# Patient Record
Sex: MALE | Race: WHITE | URBAN - METROPOLITAN AREA
[De-identification: names, ages, dates, MRNs, and addresses within clinical notes are randomized per-mention and may not be internally consistent; named-entity substitution may affect disease eponyms.]

---

## 2023-01-24 ENCOUNTER — OFFICE (OUTPATIENT)
Dept: URBAN - METROPOLITAN AREA CLINIC 114 | Facility: CLINIC | Age: 48
Setting detail: OPHTHALMOLOGY
End: 2023-01-24
Payer: COMMERCIAL

## 2023-01-24 DIAGNOSIS — H16.223: ICD-10-CM

## 2023-01-24 DIAGNOSIS — B30.9: ICD-10-CM

## 2023-01-24 DIAGNOSIS — H47.233: ICD-10-CM

## 2023-01-24 PROBLEM — H16.222 DRY EYE SYNDROME K SICCA; RIGHT EYE, LEFT EYE, BOTH EYES: Status: ACTIVE | Noted: 2023-01-24

## 2023-01-24 PROBLEM — H16.221 DRY EYE SYNDROME K SICCA; RIGHT EYE, LEFT EYE, BOTH EYES: Status: ACTIVE | Noted: 2023-01-24

## 2023-01-24 PROCEDURE — 92004 COMPRE OPH EXAM NEW PT 1/>: CPT | Performed by: OPHTHALMOLOGY

## 2023-01-24 PROCEDURE — 92250 FUNDUS PHOTOGRAPHY W/I&R: CPT | Performed by: OPHTHALMOLOGY

## 2023-01-24 PROCEDURE — 83861 MICROFLUID ANALY TEARS: CPT | Performed by: OPHTHALMOLOGY

## 2023-01-24 ASSESSMENT — KERATOMETRY
METHOD_AUTO_MANUAL: AUTO
OD_K2POWER_DIOPTERS: 41.00
OD_AXISANGLE_DEGREES: 085
OD_K1POWER_DIOPTERS: 40.75
OS_K2POWER_DIOPTERS: 40.75
OS_K1POWER_DIOPTERS: 40.50
OS_AXISANGLE_DEGREES: 106

## 2023-01-24 ASSESSMENT — TEAR BREAK UP TIME (TBUT)
OS_TBUT: T
OD_TBUT: T

## 2023-01-24 ASSESSMENT — REFRACTION_AUTOREFRACTION
OS_SPHERE: +0.25
OS_AXIS: 032
OD_SPHERE: +0.25
OS_CYLINDER: -0.25

## 2023-01-24 ASSESSMENT — TONOMETRY
OS_IOP_MMHG: 13
OD_IOP_MMHG: 14

## 2023-01-24 ASSESSMENT — CONFRONTATIONAL VISUAL FIELD TEST (CVF)
OS_FINDINGS: FULL
OD_FINDINGS: FULL

## 2023-01-24 ASSESSMENT — AXIALLENGTH_DERIVED: OS_AL: 24.6441

## 2023-01-24 ASSESSMENT — VISUAL ACUITY
OD_BCVA: 20/25
OS_BCVA: 20/25

## 2023-01-24 ASSESSMENT — SPHEQUIV_DERIVED: OS_SPHEQUIV: 0.125

## 2023-11-27 ENCOUNTER — RX ONLY (RX ONLY)
Age: 48
End: 2023-11-27

## 2023-11-27 ENCOUNTER — OFFICE (OUTPATIENT)
Dept: URBAN - METROPOLITAN AREA CLINIC 104 | Facility: CLINIC | Age: 48
Setting detail: OPHTHALMOLOGY
End: 2023-11-27
Payer: COMMERCIAL

## 2023-11-27 DIAGNOSIS — H16.221: ICD-10-CM

## 2023-11-27 DIAGNOSIS — H16.223: ICD-10-CM

## 2023-11-27 DIAGNOSIS — H47.233: ICD-10-CM

## 2023-11-27 DIAGNOSIS — H16.222: ICD-10-CM

## 2023-11-27 PROCEDURE — 99213 OFFICE O/P EST LOW 20 MIN: CPT | Performed by: OPHTHALMOLOGY

## 2023-11-27 ASSESSMENT — REFRACTION_AUTOREFRACTION
OS_AXIS: 138
OD_CYLINDER: SPH
OD_AXIS: 000
OD_SPHERE: PLANO
OS_SPHERE: PLANO
OS_CYLINDER: -0.25

## 2023-11-27 ASSESSMENT — CONFRONTATIONAL VISUAL FIELD TEST (CVF)
OS_FINDINGS: FULL
OD_FINDINGS: FULL

## 2023-11-27 ASSESSMENT — TEAR BREAK UP TIME (TBUT)
OS_TBUT: T
OD_TBUT: T

## 2024-01-12 ENCOUNTER — OUTPATIENT (OUTPATIENT)
Dept: OUTPATIENT SERVICES | Facility: HOSPITAL | Age: 49
LOS: 1 days | End: 2024-01-12
Payer: COMMERCIAL

## 2024-01-12 VITALS
DIASTOLIC BLOOD PRESSURE: 89 MMHG | TEMPERATURE: 98 F | SYSTOLIC BLOOD PRESSURE: 149 MMHG | HEIGHT: 70 IN | WEIGHT: 250 LBS | RESPIRATION RATE: 16 BRPM | HEART RATE: 60 BPM | OXYGEN SATURATION: 99 %

## 2024-01-12 DIAGNOSIS — S83.232D COMPLEX TEAR OF MEDIAL MENISCUS, CURRENT INJURY, LEFT KNEE, SUBSEQUENT ENCOUNTER: ICD-10-CM

## 2024-01-12 DIAGNOSIS — Z01.818 ENCOUNTER FOR OTHER PREPROCEDURAL EXAMINATION: ICD-10-CM

## 2024-01-12 DIAGNOSIS — Z98.890 OTHER SPECIFIED POSTPROCEDURAL STATES: Chronic | ICD-10-CM

## 2024-01-12 LAB
ANION GAP SERPL CALC-SCNC: 9 MMOL/L — SIGNIFICANT CHANGE UP (ref 5–17)
ANION GAP SERPL CALC-SCNC: 9 MMOL/L — SIGNIFICANT CHANGE UP (ref 5–17)
BUN SERPL-MCNC: 18 MG/DL — SIGNIFICANT CHANGE UP (ref 7–23)
BUN SERPL-MCNC: 18 MG/DL — SIGNIFICANT CHANGE UP (ref 7–23)
CALCIUM SERPL-MCNC: 9.2 MG/DL — SIGNIFICANT CHANGE UP (ref 8.4–10.5)
CALCIUM SERPL-MCNC: 9.2 MG/DL — SIGNIFICANT CHANGE UP (ref 8.4–10.5)
CHLORIDE SERPL-SCNC: 102 MMOL/L — SIGNIFICANT CHANGE UP (ref 96–108)
CHLORIDE SERPL-SCNC: 102 MMOL/L — SIGNIFICANT CHANGE UP (ref 96–108)
CO2 SERPL-SCNC: 27 MMOL/L — SIGNIFICANT CHANGE UP (ref 22–31)
CO2 SERPL-SCNC: 27 MMOL/L — SIGNIFICANT CHANGE UP (ref 22–31)
CREAT SERPL-MCNC: 1.09 MG/DL — SIGNIFICANT CHANGE UP (ref 0.5–1.3)
CREAT SERPL-MCNC: 1.09 MG/DL — SIGNIFICANT CHANGE UP (ref 0.5–1.3)
EGFR: 84 ML/MIN/1.73M2 — SIGNIFICANT CHANGE UP
EGFR: 84 ML/MIN/1.73M2 — SIGNIFICANT CHANGE UP
GLUCOSE SERPL-MCNC: 104 MG/DL — HIGH (ref 70–99)
GLUCOSE SERPL-MCNC: 104 MG/DL — HIGH (ref 70–99)
HCT VFR BLD CALC: 41.9 % — SIGNIFICANT CHANGE UP (ref 39–50)
HCT VFR BLD CALC: 41.9 % — SIGNIFICANT CHANGE UP (ref 39–50)
HGB BLD-MCNC: 14 G/DL — SIGNIFICANT CHANGE UP (ref 13–17)
HGB BLD-MCNC: 14 G/DL — SIGNIFICANT CHANGE UP (ref 13–17)
MCHC RBC-ENTMCNC: 29 PG — SIGNIFICANT CHANGE UP (ref 27–34)
MCHC RBC-ENTMCNC: 29 PG — SIGNIFICANT CHANGE UP (ref 27–34)
MCHC RBC-ENTMCNC: 33.4 GM/DL — SIGNIFICANT CHANGE UP (ref 32–36)
MCHC RBC-ENTMCNC: 33.4 GM/DL — SIGNIFICANT CHANGE UP (ref 32–36)
MCV RBC AUTO: 86.9 FL — SIGNIFICANT CHANGE UP (ref 80–100)
MCV RBC AUTO: 86.9 FL — SIGNIFICANT CHANGE UP (ref 80–100)
NRBC # BLD: 0 /100 WBCS — SIGNIFICANT CHANGE UP (ref 0–0)
NRBC # BLD: 0 /100 WBCS — SIGNIFICANT CHANGE UP (ref 0–0)
PLATELET # BLD AUTO: 248 K/UL — SIGNIFICANT CHANGE UP (ref 150–400)
PLATELET # BLD AUTO: 248 K/UL — SIGNIFICANT CHANGE UP (ref 150–400)
POTASSIUM SERPL-MCNC: 4.3 MMOL/L — SIGNIFICANT CHANGE UP (ref 3.5–5.3)
POTASSIUM SERPL-MCNC: 4.3 MMOL/L — SIGNIFICANT CHANGE UP (ref 3.5–5.3)
POTASSIUM SERPL-SCNC: 4.3 MMOL/L — SIGNIFICANT CHANGE UP (ref 3.5–5.3)
POTASSIUM SERPL-SCNC: 4.3 MMOL/L — SIGNIFICANT CHANGE UP (ref 3.5–5.3)
RBC # BLD: 4.82 M/UL — SIGNIFICANT CHANGE UP (ref 4.2–5.8)
RBC # BLD: 4.82 M/UL — SIGNIFICANT CHANGE UP (ref 4.2–5.8)
RBC # FLD: 12.8 % — SIGNIFICANT CHANGE UP (ref 10.3–14.5)
RBC # FLD: 12.8 % — SIGNIFICANT CHANGE UP (ref 10.3–14.5)
SODIUM SERPL-SCNC: 138 MMOL/L — SIGNIFICANT CHANGE UP (ref 135–145)
SODIUM SERPL-SCNC: 138 MMOL/L — SIGNIFICANT CHANGE UP (ref 135–145)
WBC # BLD: 5.3 K/UL — SIGNIFICANT CHANGE UP (ref 3.8–10.5)
WBC # BLD: 5.3 K/UL — SIGNIFICANT CHANGE UP (ref 3.8–10.5)
WBC # FLD AUTO: 5.3 K/UL — SIGNIFICANT CHANGE UP (ref 3.8–10.5)
WBC # FLD AUTO: 5.3 K/UL — SIGNIFICANT CHANGE UP (ref 3.8–10.5)

## 2024-01-12 PROCEDURE — 93010 ELECTROCARDIOGRAM REPORT: CPT

## 2024-01-12 PROCEDURE — G0463: CPT

## 2024-01-12 PROCEDURE — 36415 COLL VENOUS BLD VENIPUNCTURE: CPT

## 2024-01-12 PROCEDURE — 93005 ELECTROCARDIOGRAM TRACING: CPT

## 2024-01-12 PROCEDURE — 80048 BASIC METABOLIC PNL TOTAL CA: CPT

## 2024-01-12 PROCEDURE — 85027 COMPLETE CBC AUTOMATED: CPT

## 2024-01-12 NOTE — H&P PST ADULT - HISTORY OF PRESENT ILLNESS
47 y/o male with left knee pain/discomfort since aug,23. 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 Denies any acute injury, 49 y/o male with left knee pain/discomfort since aug,23. 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 Denies any acute injury, 47 y/o male with left knee pain/discomfort since aug,23. 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 Denies any acute injury, MRI revealed Medial meniscus tear and was advised surgery

## 2024-01-12 NOTE — H&P PST ADULT - NSICDXPASTMEDICALHX_GEN_ALL_CORE_FT
PAST MEDICAL HISTORY:  2019 novel coronavirus disease (COVID-19)     HLD (hyperlipidemia)     Hypertension     Hypothyroidism

## 2024-01-12 NOTE — H&P PST ADULT - ATTENDING COMMENTS
The patient has a left knee medial meniscus tear. He has failed conservative management. I recommend a left knee arthroscopy with partial medial meniscectomy v. repair. The risks of the procedure, including bleeding, infection, pain, stiffness, damage to muscles / vessels / nerves, and need for additional surgery, were reviewed. The benefits, including decreased pain and improved function, were also reviewed. The patient understands the risks and benefits and wishes to proceed with surgery.

## 2024-01-12 NOTE — H&P PST ADULT - ASSESSMENT
49 y/o male with left knee pain  Planned surgery.- left knee arthroscopy-partial medial meniscectomy  Will obtain medical clearance  Pre op instructions provided  Instructions provided on medications to continue and to take the day morning of surgery

## 2024-01-25 ENCOUNTER — OUTPATIENT (OUTPATIENT)
Dept: OUTPATIENT SERVICES | Facility: HOSPITAL | Age: 49
LOS: 1 days | End: 2024-01-25
Payer: COMMERCIAL

## 2024-01-25 VITALS
HEART RATE: 64 BPM | OXYGEN SATURATION: 98 % | DIASTOLIC BLOOD PRESSURE: 59 MMHG | RESPIRATION RATE: 19 BRPM | SYSTOLIC BLOOD PRESSURE: 97 MMHG

## 2024-01-25 VITALS
HEIGHT: 70 IN | TEMPERATURE: 98 F | HEART RATE: 78 BPM | WEIGHT: 253.97 LBS | RESPIRATION RATE: 15 BRPM | SYSTOLIC BLOOD PRESSURE: 133 MMHG | OXYGEN SATURATION: 97 % | DIASTOLIC BLOOD PRESSURE: 87 MMHG

## 2024-01-25 DIAGNOSIS — Z98.890 OTHER SPECIFIED POSTPROCEDURAL STATES: Chronic | ICD-10-CM

## 2024-01-25 DIAGNOSIS — S83.232D COMPLEX TEAR OF MEDIAL MENISCUS, CURRENT INJURY, LEFT KNEE, SUBSEQUENT ENCOUNTER: ICD-10-CM

## 2024-01-25 PROCEDURE — 97161 PT EVAL LOW COMPLEX 20 MIN: CPT

## 2024-01-25 PROCEDURE — 29881 ARTHRS KNE SRG MNISECTMY M/L: CPT | Mod: AS,LT

## 2024-01-25 PROCEDURE — 29881 ARTHRS KNE SRG MNISECTMY M/L: CPT | Mod: LT

## 2024-01-25 RX ORDER — CHLORHEXIDINE GLUCONATE 213 G/1000ML
1 SOLUTION TOPICAL ONCE
Refills: 0 | Status: COMPLETED | OUTPATIENT
Start: 2024-01-25 | End: 2024-01-25

## 2024-01-25 RX ORDER — ROSUVASTATIN CALCIUM 5 MG/1
0 TABLET ORAL
Refills: 0 | DISCHARGE

## 2024-01-25 RX ORDER — CEFAZOLIN SODIUM 1 G
2000 VIAL (EA) INJECTION ONCE
Refills: 0 | Status: COMPLETED | OUTPATIENT
Start: 2024-01-25 | End: 2024-01-25

## 2024-01-25 RX ORDER — LEVOTHYROXINE SODIUM 125 MCG
0 TABLET ORAL
Refills: 0 | DISCHARGE

## 2024-01-25 RX ORDER — SODIUM CHLORIDE 9 MG/ML
1000 INJECTION, SOLUTION INTRAVENOUS
Refills: 0 | Status: DISCONTINUED | OUTPATIENT
Start: 2024-01-25 | End: 2024-01-25

## 2024-01-25 RX ORDER — LOSARTAN POTASSIUM 100 MG/1
0 TABLET, FILM COATED ORAL
Refills: 0 | DISCHARGE

## 2024-01-25 RX ORDER — OXYCODONE HYDROCHLORIDE 5 MG/1
5 TABLET ORAL ONCE
Refills: 0 | Status: DISCONTINUED | OUTPATIENT
Start: 2024-01-25 | End: 2024-01-25

## 2024-01-25 RX ORDER — ONDANSETRON 8 MG/1
4 TABLET, FILM COATED ORAL ONCE
Refills: 0 | Status: DISCONTINUED | OUTPATIENT
Start: 2024-01-25 | End: 2024-01-25

## 2024-01-25 RX ORDER — APREPITANT 80 MG/1
40 CAPSULE ORAL ONCE
Refills: 0 | Status: COMPLETED | OUTPATIENT
Start: 2024-01-25 | End: 2024-01-25

## 2024-01-25 RX ORDER — HYDROMORPHONE HYDROCHLORIDE 2 MG/ML
1 INJECTION INTRAMUSCULAR; INTRAVENOUS; SUBCUTANEOUS
Refills: 0 | Status: DISCONTINUED | OUTPATIENT
Start: 2024-01-25 | End: 2024-01-25

## 2024-01-25 RX ORDER — HYDROMORPHONE HYDROCHLORIDE 2 MG/ML
0.5 INJECTION INTRAMUSCULAR; INTRAVENOUS; SUBCUTANEOUS
Refills: 0 | Status: DISCONTINUED | OUTPATIENT
Start: 2024-01-25 | End: 2024-01-25

## 2024-01-25 RX ORDER — NEOMYCIN/POLYMYXIN B/DEXAMETHA 0.1 %
0 SUSPENSION, DROPS(FINAL DOSAGE FORM)(ML) OPHTHALMIC (EYE)
Refills: 0 | DISCHARGE

## 2024-01-25 RX ADMIN — APREPITANT 40 MILLIGRAM(S): 80 CAPSULE ORAL at 06:47

## 2024-01-25 RX ADMIN — CHLORHEXIDINE GLUCONATE 1 APPLICATION(S): 213 SOLUTION TOPICAL at 06:48

## 2024-01-25 NOTE — ASU DISCHARGE PLAN (ADULT/PEDIATRIC) - CARE PROVIDER_API CALL
Ranjan Dobson Yonathan  Orthopaedic Surgery  [address] [place] [address]  Phone: [phone]  Fax: [phone]  Follow Up Time:

## 2024-01-25 NOTE — BRIEF OPERATIVE NOTE - NSICDXBRIEFPROCEDURE_GEN_ALL_CORE_FT
PROCEDURES:  Arthroscopy, knee, with partial medial meniscectomy 25-Jan-2024 08:47:15  Eddie Hebert

## 2024-01-25 NOTE — PHYSICAL THERAPY INITIAL EVALUATION ADULT - PERTINENT HX OF CURRENT PROBLEM, REHAB EVAL
47 y/o male with left knee pain/discomfort since aug,23. 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 Denies any acute injury, MRI revealed Medial meniscus tear and was advised surgery

## 2024-01-25 NOTE — BRIEF OPERATIVE NOTE - VENOUS THROMBOEMBOLISM PROPHYLAXIS THERAPY
----- Message from Ezra Handy MD sent at 1/15/2021  4:20 PM CST -----  Normal echo- (no surprises).  Good news, please call.  D  
Called and reviewed results per Dr. Handy request. Patient very appreciative of the phone and quick results today.   
PAS

## 2024-01-25 NOTE — PHYSICAL THERAPY INITIAL EVALUATION ADULT - PLANNED THERAPY INTERVENTIONS, PT EVAL
Pt received in PACU,sitting OOB chair. Pt seen for transfer, ambulation training WBAT left LE with cane. Pt verbally educated on stair negotiation and given written instructions. Pt given cane for home use. Pt is cleared from PT.

## 2024-01-25 NOTE — ASU DISCHARGE PLAN (ADULT/PEDIATRIC) - NS MD DC FALL RISK RISK
For information on Fall & Injury Prevention, visit: [email or web address] OR  [email or web address] OR  https://www.cdc.gov/steadi/patient.html

## 2024-01-25 NOTE — ASU PATIENT PROFILE, ADULT - PREOP PAIN SCORE
----- Message from Riya Jones DO sent at 7/27/2017  8:31 AM EDT -----  This is a little on the high side but not markedly elevated and this lady has some dementia and I do not think wanted to be on statins and actually came off of Lipitor, so I would just tell her that looks fairly good and I do not recommend any other treatment at this time.  ES 0

## 2024-01-25 NOTE — ASU PREOP CHECKLIST - HAIR REMOVAL
Continue your home medications as prescribed  Drink plenty of fluids for hydration  Return to the emergency department for any worsening symptoms or new concerns
clipper

## 2024-02-21 NOTE — ASU PATIENT PROFILE, ADULT - HISTORY OF COVID-19 VACCINATION
Detail Level: Detailed Was A Bandage Applied: Yes Punch Size In Mm: 5 Size Of Lesion In Cm (Optional): 0 Depth Of Punch Biopsy: dermis Biopsy Type: H and E Anesthesia Type: 1% lidocaine with epinephrine Anesthesia Volume In Cc: 0.5 Hemostasis: None Epidermal Sutures: 5-0 Prolene Wound Care: Vaseline Dressing: bandage Suture Removal: 14 days Patient Will Remove Sutures At Home?: No Lab: -[id number] Consent: Written consent was obtained and risks were reviewed including but not limited to scarring, infection, bleeding, scabbing, incomplete removal, nerve damage and allergy to anesthesia. Post-Care Instructions: I reviewed with the patient in detail post-care instructions. Patient is to keep the biopsy site dry overnight, and then apply bacitracin twice daily until healed. Patient may apply hydrogen peroxide soaks to remove any crusting. Home Suture Removal Text: Patient was provided a home suture removal kit and will remove their sutures at home.  If they have any questions or difficulties they will call the office. Notification Instructions: Patient will be notified of biopsy results. However, patient instructed to call the office if not contacted within 2 weeks. Billing Type: Third-Party Bill Information: Selecting Yes will display possible errors in your note based on the variables you have selected. This validation is only offered as a suggestion for you. PLEASE NOTE THAT THE VALIDATION TEXT WILL BE REMOVED WHEN YOU FINALIZE YOUR NOTE. IF YOU WANT TO FAX A PRELIMINARY NOTE YOU WILL NEED TO TOGGLE THIS TO 'NO' IF YOU DO NOT WANT IT IN YOUR FAXED NOTE. Yes

## 2024-03-04 ENCOUNTER — RX ONLY (RX ONLY)
Age: 49
End: 2024-03-04

## 2024-03-04 ENCOUNTER — OFFICE (OUTPATIENT)
Dept: URBAN - METROPOLITAN AREA CLINIC 104 | Facility: CLINIC | Age: 49
Setting detail: OPHTHALMOLOGY
End: 2024-03-04
Payer: COMMERCIAL

## 2024-03-04 DIAGNOSIS — H16.223: ICD-10-CM

## 2024-03-04 DIAGNOSIS — H47.233: ICD-10-CM

## 2024-03-04 PROBLEM — H16.222 DRY EYE SYNDROME K SICCA; RIGHT EYE, LEFT EYE, BOTH EYES: Status: ACTIVE | Noted: 2024-03-04

## 2024-03-04 PROBLEM — H16.221 DRY EYE SYNDROME K SICCA; RIGHT EYE, LEFT EYE, BOTH EYES: Status: ACTIVE | Noted: 2024-03-04

## 2024-03-04 PROCEDURE — 99213 OFFICE O/P EST LOW 20 MIN: CPT | Performed by: OPHTHALMOLOGY

## 2024-03-04 PROCEDURE — 83861 MICROFLUID ANALY TEARS: CPT | Performed by: OPHTHALMOLOGY

## 2024-06-10 ENCOUNTER — OFFICE (OUTPATIENT)
Dept: URBAN - METROPOLITAN AREA CLINIC 104 | Facility: CLINIC | Age: 49
Setting detail: OPHTHALMOLOGY
End: 2024-06-10
Payer: COMMERCIAL

## 2024-06-10 ENCOUNTER — RX ONLY (RX ONLY)
Age: 49
End: 2024-06-10

## 2024-06-10 DIAGNOSIS — H01.004: ICD-10-CM

## 2024-06-10 DIAGNOSIS — H01.001: ICD-10-CM

## 2024-06-10 DIAGNOSIS — H16.223: ICD-10-CM

## 2024-06-10 DIAGNOSIS — H01.002: ICD-10-CM

## 2024-06-10 DIAGNOSIS — H01.005: ICD-10-CM

## 2024-06-10 PROBLEM — B88.0 GLAUCOMATOUS ATROPHY  OF OPTIC DISC; BOTH EYES: Status: ACTIVE | Noted: 2024-06-10

## 2024-06-10 PROBLEM — B88.0 ACARIASIS, INFESTATION OF MITES AND OR TICS: Status: ACTIVE | Noted: 2024-06-10

## 2024-06-10 PROCEDURE — 99213 OFFICE O/P EST LOW 20 MIN: CPT | Performed by: OPHTHALMOLOGY

## 2024-06-10 PROCEDURE — 83861 MICROFLUID ANALY TEARS: CPT | Performed by: OPHTHALMOLOGY

## 2024-06-10 ASSESSMENT — LID EXAM ASSESSMENTS
OD_COMMENTS: NO COLLARETS
OD_BLEPHARITIS: RLL RUL T
OS_BLEPHARITIS: LLL LUL T
OS_COMMENTS: NO COLLARETS

## 2024-06-10 ASSESSMENT — CONFRONTATIONAL VISUAL FIELD TEST (CVF)
OS_FINDINGS: FULL
OD_FINDINGS: FULL

## 2024-09-16 ENCOUNTER — OFFICE (OUTPATIENT)
Dept: URBAN - METROPOLITAN AREA CLINIC 104 | Facility: CLINIC | Age: 49
Setting detail: OPHTHALMOLOGY
End: 2024-09-16
Payer: COMMERCIAL

## 2024-09-16 DIAGNOSIS — H01.001: ICD-10-CM

## 2024-09-16 DIAGNOSIS — H16.222: ICD-10-CM

## 2024-09-16 DIAGNOSIS — B88.0: ICD-10-CM

## 2024-09-16 DIAGNOSIS — H16.223: ICD-10-CM

## 2024-09-16 DIAGNOSIS — H01.005: ICD-10-CM

## 2024-09-16 DIAGNOSIS — H16.221: ICD-10-CM

## 2024-09-16 DIAGNOSIS — H01.002: ICD-10-CM

## 2024-09-16 DIAGNOSIS — H01.004: ICD-10-CM

## 2024-09-16 PROCEDURE — 83861 MICROFLUID ANALY TEARS: CPT | Performed by: OPHTHALMOLOGY

## 2024-09-16 PROCEDURE — 92133 CPTRZD OPH DX IMG PST SGM ON: CPT | Performed by: OPHTHALMOLOGY

## 2024-09-16 PROCEDURE — 99213 OFFICE O/P EST LOW 20 MIN: CPT | Performed by: OPHTHALMOLOGY

## 2024-09-16 ASSESSMENT — CONFRONTATIONAL VISUAL FIELD TEST (CVF)
OD_FINDINGS: FULL
OS_FINDINGS: FULL

## 2024-11-11 PROBLEM — E78.5 HYPERLIPIDEMIA, UNSPECIFIED: Chronic | Status: ACTIVE | Noted: 2024-01-12

## 2024-11-11 PROBLEM — E03.9 HYPOTHYROIDISM, UNSPECIFIED: Chronic | Status: ACTIVE | Noted: 2024-01-12

## 2024-11-11 PROBLEM — I10 ESSENTIAL (PRIMARY) HYPERTENSION: Chronic | Status: ACTIVE | Noted: 2024-01-12

## 2024-11-11 PROBLEM — U07.1 COVID-19: Chronic | Status: ACTIVE | Noted: 2024-01-12

## 2024-11-22 PROBLEM — Z00.00 ENCOUNTER FOR PREVENTIVE HEALTH EXAMINATION: Status: ACTIVE | Noted: 2024-11-22

## 2024-11-27 ENCOUNTER — APPOINTMENT (OUTPATIENT)
Dept: ORTHOPEDIC SURGERY | Facility: CLINIC | Age: 49
End: 2024-11-27
Payer: COMMERCIAL

## 2024-11-27 DIAGNOSIS — M25.532 PAIN IN RIGHT WRIST: ICD-10-CM

## 2024-11-27 DIAGNOSIS — G56.00 CARPAL TUNNEL SYNDROME, UNSPECIFIED UPPER LIMB: ICD-10-CM

## 2024-11-27 DIAGNOSIS — M25.531 PAIN IN RIGHT WRIST: ICD-10-CM

## 2024-11-27 PROCEDURE — 20526 THER INJECTION CARP TUNNEL: CPT | Mod: 50

## 2024-11-27 PROCEDURE — 99204 OFFICE O/P NEW MOD 45 MIN: CPT | Mod: 25

## 2024-11-27 PROCEDURE — 73110 X-RAY EXAM OF WRIST: CPT | Mod: 50

## 2025-01-04 ENCOUNTER — APPOINTMENT (OUTPATIENT)
Dept: ORTHOPEDIC SURGERY | Facility: CLINIC | Age: 50
End: 2025-01-04
Payer: COMMERCIAL

## 2025-01-04 VITALS
HEIGHT: 70 IN | DIASTOLIC BLOOD PRESSURE: 98 MMHG | HEART RATE: 73 BPM | SYSTOLIC BLOOD PRESSURE: 151 MMHG | WEIGHT: 260 LBS | BODY MASS INDEX: 37.22 KG/M2

## 2025-01-04 DIAGNOSIS — M54.50 LOW BACK PAIN, UNSPECIFIED: ICD-10-CM

## 2025-01-04 DIAGNOSIS — Z78.9 OTHER SPECIFIED HEALTH STATUS: ICD-10-CM

## 2025-01-04 DIAGNOSIS — M25.551 PAIN IN RIGHT HIP: ICD-10-CM

## 2025-01-04 DIAGNOSIS — Z86.39 PERSONAL HISTORY OF OTHER ENDOCRINE, NUTRITIONAL AND METABOLIC DISEASE: ICD-10-CM

## 2025-01-04 DIAGNOSIS — S79.911A UNSPECIFIED INJURY OF RIGHT HIP, INITIAL ENCOUNTER: ICD-10-CM

## 2025-01-04 DIAGNOSIS — Z86.79 PERSONAL HISTORY OF OTHER DISEASES OF THE CIRCULATORY SYSTEM: ICD-10-CM

## 2025-01-04 PROCEDURE — 99204 OFFICE O/P NEW MOD 45 MIN: CPT

## 2025-01-04 PROCEDURE — 73502 X-RAY EXAM HIP UNI 2-3 VIEWS: CPT

## 2025-01-04 PROCEDURE — 72100 X-RAY EXAM L-S SPINE 2/3 VWS: CPT

## 2025-01-04 RX ORDER — LEVOTHYROXINE SODIUM 137 UG/1
TABLET ORAL
Refills: 0 | Status: ACTIVE | COMMUNITY

## 2025-01-04 RX ORDER — ROSUVASTATIN CALCIUM 10 MG/1
10 TABLET, FILM COATED ORAL
Refills: 0 | Status: ACTIVE | COMMUNITY

## 2025-01-04 RX ORDER — LOSARTAN POTASSIUM 100 MG/1
TABLET, FILM COATED ORAL
Refills: 0 | Status: ACTIVE | COMMUNITY

## 2025-01-06 PROBLEM — M54.50 PAIN, LOW BACK: Status: ACTIVE | Noted: 2025-01-04

## 2025-01-06 PROBLEM — M25.551 RIGHT HIP PAIN: Status: ACTIVE | Noted: 2025-01-04

## 2025-01-07 ENCOUNTER — APPOINTMENT (OUTPATIENT)
Dept: MRI IMAGING | Facility: CLINIC | Age: 50
End: 2025-01-07
Payer: COMMERCIAL

## 2025-01-07 PROCEDURE — 73721 MRI JNT OF LWR EXTRE W/O DYE: CPT | Mod: RT

## 2025-01-08 ENCOUNTER — NON-APPOINTMENT (OUTPATIENT)
Age: 50
End: 2025-01-08

## 2025-01-10 ENCOUNTER — APPOINTMENT (OUTPATIENT)
Dept: ORTHOPEDIC SURGERY | Facility: CLINIC | Age: 50
End: 2025-01-10

## 2025-01-14 ENCOUNTER — APPOINTMENT (OUTPATIENT)
Dept: ORTHOPEDIC SURGERY | Facility: CLINIC | Age: 50
End: 2025-01-14
Payer: COMMERCIAL

## 2025-01-14 VITALS
OXYGEN SATURATION: 99 % | DIASTOLIC BLOOD PRESSURE: 90 MMHG | SYSTOLIC BLOOD PRESSURE: 136 MMHG | BODY MASS INDEX: 37.22 KG/M2 | HEART RATE: 82 BPM | WEIGHT: 260 LBS | HEIGHT: 70 IN

## 2025-01-14 DIAGNOSIS — M87.051 IDIOPATHIC ASEPTIC NECROSIS OF RIGHT FEMUR: ICD-10-CM

## 2025-01-14 PROCEDURE — 73502 X-RAY EXAM HIP UNI 2-3 VIEWS: CPT

## 2025-01-14 PROCEDURE — G2211 COMPLEX E/M VISIT ADD ON: CPT

## 2025-01-14 PROCEDURE — 99215 OFFICE O/P EST HI 40 MIN: CPT

## 2025-01-15 ENCOUNTER — APPOINTMENT (OUTPATIENT)
Dept: ORTHOPEDIC SURGERY | Facility: CLINIC | Age: 50
End: 2025-01-15

## 2025-01-28 RX ORDER — NAPROXEN 500 MG/1
500 TABLET ORAL
Qty: 30 | Refills: 0 | Status: ACTIVE | COMMUNITY
Start: 2025-01-28 | End: 1900-01-01

## 2025-02-07 RX ORDER — MELOXICAM 7.5 MG/1
7.5 TABLET ORAL
Qty: 60 | Refills: 0 | Status: ACTIVE | COMMUNITY
Start: 2025-02-07 | End: 1900-01-01

## 2025-02-07 RX ORDER — TRAMADOL HYDROCHLORIDE 50 MG/1
50 TABLET, COATED ORAL
Qty: 21 | Refills: 0 | Status: ACTIVE | COMMUNITY
Start: 2025-02-07 | End: 1900-01-01

## 2025-02-13 ENCOUNTER — APPOINTMENT (OUTPATIENT)
Dept: INTERVENTIONAL RADIOLOGY/VASCULAR | Facility: CLINIC | Age: 50
End: 2025-02-13

## 2025-02-21 ENCOUNTER — NON-APPOINTMENT (OUTPATIENT)
Age: 50
End: 2025-02-21

## 2025-03-10 ENCOUNTER — OFFICE (OUTPATIENT)
Dept: URBAN - METROPOLITAN AREA CLINIC 115 | Facility: CLINIC | Age: 50
Setting detail: OPHTHALMOLOGY
End: 2025-03-10
Payer: COMMERCIAL

## 2025-03-10 DIAGNOSIS — H01.001: ICD-10-CM

## 2025-03-10 DIAGNOSIS — H01.005: ICD-10-CM

## 2025-03-10 DIAGNOSIS — H01.002: ICD-10-CM

## 2025-03-10 DIAGNOSIS — H01.004: ICD-10-CM

## 2025-03-10 PROBLEM — H16.223 DRY EYE SYNDROME K SICCA; RIGHT EYE, LEFT EYE, BOTH EYES: Status: ACTIVE | Noted: 2025-03-10

## 2025-03-10 PROBLEM — H16.222 DRY EYE SYNDROME K SICCA; RIGHT EYE, LEFT EYE, BOTH EYES: Status: ACTIVE | Noted: 2025-03-10

## 2025-03-10 PROBLEM — H16.221 DRY EYE SYNDROME K SICCA; RIGHT EYE, LEFT EYE, BOTH EYES: Status: ACTIVE | Noted: 2025-03-10

## 2025-03-10 PROCEDURE — 92012 INTRM OPH EXAM EST PATIENT: CPT | Performed by: OPHTHALMOLOGY

## 2025-03-10 ASSESSMENT — LID EXAM ASSESSMENTS
OD_BLEPHARITIS: RLL RUL T 1+
OS_BLEPHARITIS: LLL LUL T 1+

## 2025-03-10 ASSESSMENT — REFRACTION_AUTOREFRACTION
OS_SPHERE: UTP
OD_SPHERE: UTP

## 2025-03-10 ASSESSMENT — VISUAL ACUITY
OS_BCVA: 20/20
OD_BCVA: 20/25

## 2025-03-10 ASSESSMENT — CONFRONTATIONAL VISUAL FIELD TEST (CVF)
OD_FINDINGS: FULL
OS_FINDINGS: FULL

## 2025-03-10 ASSESSMENT — KERATOMETRY
OS_K1POWER_DIOPTERS: 40.37
OS_K2POWER_DIOPTERS: 40.61
OD_K1POWER_DIOPTERS: 40.66
OD_K2POWER_DIOPTERS: 40.81
OS_AXISANGLE_DEGREES: 160
OD_AXISANGLE_DEGREES: 079

## 2025-03-10 ASSESSMENT — TEAR BREAK UP TIME (TBUT)
OD_TBUT: T
OS_TBUT: T

## 2025-04-21 ENCOUNTER — APPOINTMENT (OUTPATIENT)
Dept: CT IMAGING | Facility: CLINIC | Age: 50
End: 2025-04-21
Payer: COMMERCIAL

## 2025-04-21 PROCEDURE — 73700 CT LOWER EXTREMITY W/O DYE: CPT | Mod: RT

## 2025-05-01 ENCOUNTER — OUTPATIENT (OUTPATIENT)
Dept: OUTPATIENT SERVICES | Facility: HOSPITAL | Age: 50
LOS: 1 days | End: 2025-05-01
Payer: COMMERCIAL

## 2025-05-01 VITALS
HEIGHT: 70 IN | WEIGHT: 249.12 LBS | OXYGEN SATURATION: 99 % | RESPIRATION RATE: 18 BRPM | DIASTOLIC BLOOD PRESSURE: 80 MMHG | SYSTOLIC BLOOD PRESSURE: 130 MMHG | TEMPERATURE: 97 F | HEART RATE: 81 BPM

## 2025-05-01 DIAGNOSIS — Z98.890 OTHER SPECIFIED POSTPROCEDURAL STATES: Chronic | ICD-10-CM

## 2025-05-01 DIAGNOSIS — E03.9 HYPOTHYROIDISM, UNSPECIFIED: ICD-10-CM

## 2025-05-01 DIAGNOSIS — M87.051 IDIOPATHIC ASEPTIC NECROSIS OF RIGHT FEMUR: ICD-10-CM

## 2025-05-01 DIAGNOSIS — Z01.818 ENCOUNTER FOR OTHER PREPROCEDURAL EXAMINATION: ICD-10-CM

## 2025-05-01 DIAGNOSIS — Z29.9 ENCOUNTER FOR PROPHYLACTIC MEASURES, UNSPECIFIED: ICD-10-CM

## 2025-05-01 DIAGNOSIS — I10 ESSENTIAL (PRIMARY) HYPERTENSION: ICD-10-CM

## 2025-05-01 LAB
A1C WITH ESTIMATED AVERAGE GLUCOSE RESULT: 5.6 % — SIGNIFICANT CHANGE UP (ref 4–5.6)
ALBUMIN SERPL ELPH-MCNC: 4.4 G/DL — SIGNIFICANT CHANGE UP (ref 3.3–5.2)
ALP SERPL-CCNC: 50 U/L — SIGNIFICANT CHANGE UP (ref 40–120)
ALT FLD-CCNC: 46 U/L — HIGH
ANION GAP SERPL CALC-SCNC: 16 MMOL/L — SIGNIFICANT CHANGE UP (ref 5–17)
APTT BLD: 31.7 SEC — SIGNIFICANT CHANGE UP (ref 26.1–36.8)
AST SERPL-CCNC: 48 U/L — HIGH
BASOPHILS # BLD AUTO: 0.09 K/UL — SIGNIFICANT CHANGE UP (ref 0–0.2)
BASOPHILS NFR BLD AUTO: 1.4 % — SIGNIFICANT CHANGE UP (ref 0–2)
BILIRUB SERPL-MCNC: 0.3 MG/DL — LOW (ref 0.4–2)
BLD GP AB SCN SERPL QL: SIGNIFICANT CHANGE UP
BUN SERPL-MCNC: 19 MG/DL — SIGNIFICANT CHANGE UP (ref 8–20)
CALCIUM SERPL-MCNC: 8.9 MG/DL — SIGNIFICANT CHANGE UP (ref 8.4–10.5)
CHLORIDE SERPL-SCNC: 102 MMOL/L — SIGNIFICANT CHANGE UP (ref 96–108)
CO2 SERPL-SCNC: 22 MMOL/L — SIGNIFICANT CHANGE UP (ref 22–29)
CREAT SERPL-MCNC: 0.89 MG/DL — SIGNIFICANT CHANGE UP (ref 0.5–1.3)
EGFR: 105 ML/MIN/1.73M2 — SIGNIFICANT CHANGE UP
EGFR: 105 ML/MIN/1.73M2 — SIGNIFICANT CHANGE UP
EOSINOPHIL # BLD AUTO: 0.21 K/UL — SIGNIFICANT CHANGE UP (ref 0–0.5)
EOSINOPHIL NFR BLD AUTO: 3.3 % — SIGNIFICANT CHANGE UP (ref 0–6)
ESTIMATED AVERAGE GLUCOSE: 114 MG/DL — SIGNIFICANT CHANGE UP (ref 68–114)
GLUCOSE SERPL-MCNC: 104 MG/DL — HIGH (ref 70–99)
HCT VFR BLD CALC: 39.9 % — SIGNIFICANT CHANGE UP (ref 39–50)
HGB BLD-MCNC: 13.2 G/DL — SIGNIFICANT CHANGE UP (ref 13–17)
IMM GRANULOCYTES # BLD AUTO: 0.01 K/UL — SIGNIFICANT CHANGE UP (ref 0–0.07)
IMM GRANULOCYTES NFR BLD AUTO: 0.2 % — SIGNIFICANT CHANGE UP (ref 0–0.9)
INR BLD: 0.95 RATIO — SIGNIFICANT CHANGE UP (ref 0.85–1.16)
LYMPHOCYTES # BLD AUTO: 2.11 K/UL — SIGNIFICANT CHANGE UP (ref 1–3.3)
LYMPHOCYTES NFR BLD AUTO: 33.5 % — SIGNIFICANT CHANGE UP (ref 13–44)
MCHC RBC-ENTMCNC: 28.4 PG — SIGNIFICANT CHANGE UP (ref 27–34)
MCHC RBC-ENTMCNC: 33.1 G/DL — SIGNIFICANT CHANGE UP (ref 32–36)
MCV RBC AUTO: 85.8 FL — SIGNIFICANT CHANGE UP (ref 80–100)
MONOCYTES # BLD AUTO: 0.68 K/UL — SIGNIFICANT CHANGE UP (ref 0–0.9)
MONOCYTES NFR BLD AUTO: 10.8 % — SIGNIFICANT CHANGE UP (ref 2–14)
MRSA PCR RESULT.: SIGNIFICANT CHANGE UP
NEUTROPHILS # BLD AUTO: 3.19 K/UL — SIGNIFICANT CHANGE UP (ref 1.8–7.4)
NEUTROPHILS NFR BLD AUTO: 50.8 % — SIGNIFICANT CHANGE UP (ref 43–77)
NRBC # BLD AUTO: 0 K/UL — SIGNIFICANT CHANGE UP (ref 0–0)
NRBC # FLD: 0 K/UL — SIGNIFICANT CHANGE UP (ref 0–0)
NRBC BLD AUTO-RTO: 0 /100 WBCS — SIGNIFICANT CHANGE UP (ref 0–0)
PLATELET # BLD AUTO: 279 K/UL — SIGNIFICANT CHANGE UP (ref 150–400)
PMV BLD: 10.4 FL — SIGNIFICANT CHANGE UP (ref 7–13)
POTASSIUM SERPL-MCNC: 4 MMOL/L — SIGNIFICANT CHANGE UP (ref 3.5–5.3)
POTASSIUM SERPL-SCNC: 4 MMOL/L — SIGNIFICANT CHANGE UP (ref 3.5–5.3)
PROT SERPL-MCNC: 7.2 G/DL — SIGNIFICANT CHANGE UP (ref 6.6–8.7)
PROTHROM AB SERPL-ACNC: 11 SEC — SIGNIFICANT CHANGE UP (ref 9.9–13.4)
RBC # BLD: 4.65 M/UL — SIGNIFICANT CHANGE UP (ref 4.2–5.8)
RBC # FLD: 12.6 % — SIGNIFICANT CHANGE UP (ref 10.3–14.5)
S AUREUS DNA NOSE QL NAA+PROBE: DETECTED
SODIUM SERPL-SCNC: 140 MMOL/L — SIGNIFICANT CHANGE UP (ref 135–145)
T3 SERPL-MCNC: 113 NG/DL — SIGNIFICANT CHANGE UP (ref 80–200)
T4 AB SER-ACNC: 9.4 UG/DL — SIGNIFICANT CHANGE UP (ref 4.5–12)
TSH SERPL-MCNC: 1.32 UIU/ML — SIGNIFICANT CHANGE UP (ref 0.27–4.2)
WBC # BLD: 6.29 K/UL — SIGNIFICANT CHANGE UP (ref 3.8–10.5)
WBC # FLD AUTO: 6.29 K/UL — SIGNIFICANT CHANGE UP (ref 3.8–10.5)

## 2025-05-01 PROCEDURE — 86850 RBC ANTIBODY SCREEN: CPT

## 2025-05-01 PROCEDURE — 93010 ELECTROCARDIOGRAM REPORT: CPT

## 2025-05-01 PROCEDURE — 93005 ELECTROCARDIOGRAM TRACING: CPT

## 2025-05-01 PROCEDURE — 86901 BLOOD TYPING SEROLOGIC RH(D): CPT

## 2025-05-01 PROCEDURE — 85610 PROTHROMBIN TIME: CPT

## 2025-05-01 PROCEDURE — 84436 ASSAY OF TOTAL THYROXINE: CPT

## 2025-05-01 PROCEDURE — G0463: CPT

## 2025-05-01 PROCEDURE — 87640 STAPH A DNA AMP PROBE: CPT

## 2025-05-01 PROCEDURE — 84443 ASSAY THYROID STIM HORMONE: CPT

## 2025-05-01 PROCEDURE — 80053 COMPREHEN METABOLIC PANEL: CPT

## 2025-05-01 PROCEDURE — 86900 BLOOD TYPING SEROLOGIC ABO: CPT

## 2025-05-01 PROCEDURE — 36415 COLL VENOUS BLD VENIPUNCTURE: CPT

## 2025-05-01 PROCEDURE — 85025 COMPLETE CBC W/AUTO DIFF WBC: CPT

## 2025-05-01 PROCEDURE — 84480 ASSAY TRIIODOTHYRONINE (T3): CPT

## 2025-05-01 PROCEDURE — 83036 HEMOGLOBIN GLYCOSYLATED A1C: CPT

## 2025-05-01 PROCEDURE — 85730 THROMBOPLASTIN TIME PARTIAL: CPT

## 2025-05-01 PROCEDURE — 87641 MR-STAPH DNA AMP PROBE: CPT

## 2025-05-01 RX ORDER — LOSARTAN POTASSIUM 100 MG/1
1 TABLET, FILM COATED ORAL
Refills: 0 | DISCHARGE

## 2025-05-01 NOTE — H&P PST ADULT - HEIGHT IN FEET
----- Message from [facility], LPN sent at 2/84/8845 11:36 AM EDT -----  Regarding: Derekjanee Darby  04/19/22 11:36 AM    Hello, our patient Nicole Duggan has had Mammogram completed/performed  Please assist in updating the patient chart by making an External outreach to [facility]   located in [address] [place] [address]  The date of service is 12/2021      Thank you,  louie becerra, LPN  [address]
Upon review of the In Basket request we have found that we are unable to obtain a copy of the exclusion documentation requested because the patient is male and there is no indication in the patient chart that a mammogram has ever been indicated  Any additional questions or concerns should be emailed to the Practice Liaisons via [email or web address] email, please do not reply via In Basket      Thank you  Carlie Gann
5

## 2025-05-01 NOTE — H&P PST ADULT - ASSESSMENT
Pt  is a 49 year old male with history of  hypothyroidism , HLD , HTN and c/o  of right hip pain for about 1 year .  His hip pain started  localized to the groin and  is worse with deep ROM, as well as with weightbearing activity. 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 5/10 pain with sitting today. Pt takes naproxen with mild relief. Ambulates without assistance. 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 Patient admits to drinking daily beer for several years but  has stopped > 1 month ago since diagnosis. Pt is scheduled for Right hip Lion total hip replacement anterior approach with  Dr. Olmedo on  25 . HOLD NSAIDS ( naproxen ) / AS/Vitamins 1 week preop.  SYnthroid in am as prescribed, hold losartan in am of surgery.. medical clearance. Patient was given information on joint class, joint book provided, ERP protocol reviewed with patient, MSSA/MRSA swabbed in PST, results pending  OPIOID RISK TOOL    MASON EACH BOX THAT APPLIES AND ADD TOTALS AT THE END    FAMILY HISTORY OF SUBSTANCE ABUSE                 FEMALE         MALE                                                Alcohol                             [  ]1 pt          [  ]3pts                                               Illegal Durgs                     [  ]2 pts        [  ]3pts                                               Rx Drugs                           [  ]4 pts        [  ]4 pts    PERSONAL HISTORY OF SUBSTANCE ABUSE                                                                                          Alcohol                             [  ]3 pts       [x  ]3 pts                                               Illegal Durgs                     [  ]4 pts        [  ]4 pts                                               Rx Drugs                           [  ]5 pts        [  ]5 pts    AGE BETWEEN 16-45 YEARS                                      [  ]1 pt         [  ]1 pt    HISTORY OF PREADOLESCENT   SEXUAL ABUSE                                                             [  ]3 pts        [  ]0pts    PSYCHOLOGICAL DISEASE                     ADD, OCD, Bipolar, Schizophrenia        [  ]2 pts         [  ]2 pts                      Depression                                               [  ]1 pt           [  ]1 pt           SCORING TOTAL   (add numbers and type here)              (***3)                                     A score of 3 or lower indicated LOW risk for future opiod abuse  A score of 4 to 7 indicated moderate risk for future opiod abuse  A score of 8 or higher indicates a high risk for opiod abuse  CAPRINI SCORE    AGE RELATED RISK FACTORS                                                             [X ] Age 41-60 years                                            (1 Point)  [ ] Age: 61-74 years                                           (2 Points)                 [ ] Age= 75 years                                                (3 Points)             DISEASE RELATED RISK FACTORS                                                       [ ] Edema in the lower extremities                 (1 Point)                     [ ] Varicose veins                                               (1 Point)                                 [ X] BMI > 25 Kg/m2                                            (1 Point)                                  [ ] Serious infection (ie PNA)                            (1 Point)                     [ ] Lung disease ( COPD, Emphysema)            (1 Point)                                                                          [ ] Acute myocardial infarction                         (1 Point)                  [ ] Congestive heart failure (in the previous month)  (1 Point)         [ ] Inflammatory bowel disease                            (1 Point)                  [ ] Central venous access, PICC or Port               (2 points)       (within the last month)                                                                [ ] Stroke (in the previous month)                        (5 Points)    [ ] Previous or present malignancy                       (2 points)                                                                                                                                                         HEMATOLOGY RELATED FACTORS                                                         [ ] Prior episodes of VTE                                     (3 Points)                     [ ] Positive family history for VTE                      (3 Points)                  [ ] Prothrombin 16985 A                                     (3 Points)                     [ ] Factor V Leiden                                                (3 Points)                        [ ] Lupus anticoagulants                                      (3 Points)                                                           [ ] Anticardiolipin antibodies                              (3 Points)                                                       [ ] High homocysteine in the blood                   (3 Points)                                             [ ] Other congenital or acquired thrombophilia      (3 Points)                                                [ ] Heparin induced thrombocytopenia                  (3 Points)                                        MOBILITY RELATED FACTORS  [ ] Bed rest                                                         (1 Point)  [ ] Plaster cast                                                    (2 points)  [ ] Bed bound for more than 72 hours           (2 Points)    GENDER SPECIFIC FACTORS  [ ] Pregnancy or had a baby within the last month   (1 Point)  [ ] Post-partum < 6 weeks                                   (1 Point)  [ ] Hormonal therapy  or oral contraception   (1 Point)  [ ] History of pregnancy complications              (1 point)  [ ] Unexplained or recurrent              (1 Point)    OTHER RISK FACTORS                                           (1 Point)  [ ] BMI >40, smoking, diabetes requiring insulin, chemotherapy  blood transfusions and length of surgery over 2 hours    SURGERY RELATED RISK FACTORS  [ ]  Section within the last month     (1 Point)  [ ] Minor surgery                                                  (1 Point)  [ ] Arthroscopic surgery                                       (2 Points)  [ ] Planned major surgery lasting more            (2 Points)      than 45 minutes     [X ] Elective hip or knee joint replacement       (5 points)       surgery                                                TRAUMA RELATED RISK FACTORS  [ ] Fracture of the hip, pelvis, or leg                       (5 Points)  [ ] Spinal cord injury resulting in paralysis             (5 points)       (in the previous month)    [ ] Paralysis  (less than 1 month)                             (5 Points)  [ ] Multiple Trauma within 1 month                        (5 Points)    Total Score [   7     ]    Caprini Score 0-2: Low Risk, NO VTE prophylaxis required for most patients, encourage ambulation  Caprini Score 3-6: Moderate Risk , pharmacologic VTE prophylaxis is indicated for most patients (in the absence of contraindications)  Caprini Score Greater than or =7: High risk, pharmocologic VTE prophylaxis indicated for most patients (in the absence of contraindications)

## 2025-05-01 NOTE — H&P PST ADULT - MUSCULOSKELETAL COMMENTS
right hip and groin pain, andrzej carpal tunnel numbness right hip and groin pain, andrzej carpal tunnel numbness, andrzej hand numb at times right hip pain with sittine and leg extension

## 2025-05-01 NOTE — H&P PST ADULT - PROBLEM SELECTOR PLAN 1
Pt is scheduled for Right hip Lion total hip replacement anterior approach with  Dr. Olmedo on  5/21/25 .   HOLD NSAIDS ( naproxen ) / AS/Vitamins 1 week preop.    SYnthroid in am as prescribed, hold losartan in am of surgery..   Patient was given information on joint class, joint book provided, ERP protocol reviewed with patient, MSSA/MRSA swabbed in PST, results pending

## 2025-05-01 NOTE — H&P PST ADULT - PROBLEM/PLAN-4
Called pt to verify that Merlin home monitor is not in use.  No answer and unable to leave message.  
DISPLAY PLAN FREE TEXT

## 2025-05-01 NOTE — H&P PST ADULT - HISTORY OF PRESENT ILLNESS
History of Present Illness  Mr. TRISTAN BLACK is a 49 year old male presenting for evaluation of right hip pain. His hip pain is localized to the groin. Patient notes the pain is worse with deep ROM, as well as with weightbearing activity. Patient notes pain began one month ago. He was seen by another orthopedist and sent for MRI revealing AVN of the right femoral head as well as a labral tear. Patient has not had injections or PT. He has tried NSAIDs without improvement.  ?  Active Problems History of Present Illness  Mr. TRISTAN BLACK is a 49 year old male presenting for evaluation of right hip pain. His hip pain is localized to the groin. Patient notes the pain is worse with deep ROM, as well as with weightbearing activity. Patient notes pain began one month ago. He was seen by another orthopedist and sent for MRI revealing AVN of the right femoral head as well as a labral tear. Patient has not had injections or PT. He has tried NSAIDs without improvement.  ?  4/21/25 ct scan right hipMPRESSION:    1.  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   Pt  is a 49 year old male with history of  hypothyroidism , HLD , HTN and c/o  of right hip pain for about 1 year .  His hip pain started  localized to the groin and  is worse with deep ROM, as well as with weightbearing activity. 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 5/10 pain with sitting today. Pt takes naproxen with mild relief. Ambulates without assistance. 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 Patient admits to drinking daily beer for several years but  has stopped > 1 month ago since diagnosis. Pt is scheduled for Right hip Lion total hip replacement anterior approach with  Dr. Olmedo on  5/21/25 .   ?4/21/25 ct scan right hipMPRESSION:  1.  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

## 2025-05-01 NOTE — H&P PST ADULT - NSICDXFAMHXNEG_GEN_ALL
mom had cancer unsure of type/asthma/atrial fibrillation/brain aneurysm/cancer/congestive heart failure/COPD/emphysema/irritable bowel syndrome/kidney disease/stroke/thyroid disease

## 2025-05-02 ENCOUNTER — APPOINTMENT (OUTPATIENT)
Dept: ORTHOPEDIC SURGERY | Facility: CLINIC | Age: 50
End: 2025-05-02
Payer: COMMERCIAL

## 2025-05-02 VITALS — HEIGHT: 70 IN | BODY MASS INDEX: 37.22 KG/M2 | WEIGHT: 260 LBS

## 2025-05-02 DIAGNOSIS — M87.051 IDIOPATHIC ASEPTIC NECROSIS OF RIGHT FEMUR: ICD-10-CM

## 2025-05-02 PROCEDURE — 73502 X-RAY EXAM HIP UNI 2-3 VIEWS: CPT

## 2025-05-02 PROCEDURE — 99213 OFFICE O/P EST LOW 20 MIN: CPT

## 2025-05-02 PROCEDURE — G2211 COMPLEX E/M VISIT ADD ON: CPT

## 2025-05-05 ENCOUNTER — OFFICE (OUTPATIENT)
Dept: URBAN - METROPOLITAN AREA CLINIC 115 | Facility: CLINIC | Age: 50
Setting detail: OPHTHALMOLOGY
End: 2025-05-05
Payer: COMMERCIAL

## 2025-05-05 DIAGNOSIS — H01.001: ICD-10-CM

## 2025-05-05 DIAGNOSIS — H01.004: ICD-10-CM

## 2025-05-05 DIAGNOSIS — H16.223: ICD-10-CM

## 2025-05-05 DIAGNOSIS — H01.002: ICD-10-CM

## 2025-05-05 PROBLEM — H16.221 DRY EYE SYNDROME K SICCA; RIGHT EYE, LEFT EYE, BOTH EYES: Status: ACTIVE | Noted: 2025-05-05

## 2025-05-05 PROBLEM — H01.005 BLEPHARITIS; RIGHT UPPER LID, RIGHT LOWER LID, LEFT UPPER LID, LEFT LOWER LID: Status: ACTIVE | Noted: 2025-05-05

## 2025-05-05 PROBLEM — H16.222 DRY EYE SYNDROME K SICCA; RIGHT EYE, LEFT EYE, BOTH EYES: Status: ACTIVE | Noted: 2025-05-05

## 2025-05-05 PROCEDURE — 92012 INTRM OPH EXAM EST PATIENT: CPT | Performed by: OPHTHALMOLOGY

## 2025-05-05 ASSESSMENT — VISUAL ACUITY
OD_BCVA: 20/20
OS_BCVA: 20/25

## 2025-05-05 ASSESSMENT — REFRACTION_AUTOREFRACTION
OS_AXIS: 017
OS_CYLINDER: -0.25
OD_CYLINDER: -0.50
OD_SPHERE: +0.25
OS_SPHERE: +0.50
OD_AXIS: 168

## 2025-05-05 ASSESSMENT — TEAR BREAK UP TIME (TBUT)
OS_TBUT: T
OD_TBUT: T

## 2025-05-05 ASSESSMENT — TONOMETRY
OD_IOP_MMHG: 17
OS_IOP_MMHG: 18

## 2025-05-05 ASSESSMENT — KERATOMETRY
OD_AXISANGLE_DEGREES: 079
OS_K1POWER_DIOPTERS: 40.37
OS_K2POWER_DIOPTERS: 40.61
OD_K2POWER_DIOPTERS: 40.81
OD_K1POWER_DIOPTERS: 40.66
OS_AXISANGLE_DEGREES: 160

## 2025-05-05 ASSESSMENT — LID EXAM ASSESSMENTS
OS_BLEPHARITIS: LLL LUL T 1+
OD_BLEPHARITIS: RLL RUL T 1+

## 2025-05-05 ASSESSMENT — CONFRONTATIONAL VISUAL FIELD TEST (CVF)
OD_FINDINGS: FULL
OS_FINDINGS: FULL

## 2025-05-07 ENCOUNTER — NON-APPOINTMENT (OUTPATIENT)
Age: 50
End: 2025-05-07

## 2025-05-20 RX ORDER — TRANEXAMIC ACID 1000 MG/10
1000 AMPUL (ML) INTRAVENOUS ONCE
Refills: 0 | Status: DISCONTINUED | OUTPATIENT
Start: 2025-05-21 | End: 2025-05-21

## 2025-05-20 RX ORDER — POVIDONE-IODINE 7.5 %
1 SOLUTION, NON-ORAL TOPICAL ONCE
Refills: 0 | Status: DISCONTINUED | OUTPATIENT
Start: 2025-05-21 | End: 2025-05-21

## 2025-05-20 RX ORDER — CEFAZOLIN SODIUM IN 0.9 % NACL 3 G/100 ML
2000 INTRAVENOUS SOLUTION, PIGGYBACK (ML) INTRAVENOUS ONCE
Refills: 0 | Status: DISCONTINUED | OUTPATIENT
Start: 2025-05-21 | End: 2025-05-21

## 2025-05-21 ENCOUNTER — TRANSCRIPTION ENCOUNTER (OUTPATIENT)
Age: 50
End: 2025-05-21

## 2025-05-21 ENCOUNTER — INPATIENT (INPATIENT)
Facility: HOSPITAL | Age: 50
LOS: 0 days | Discharge: HOME HEALTH | DRG: 554 | End: 2025-05-22
Attending: ORTHOPAEDIC SURGERY | Admitting: ORTHOPAEDIC SURGERY
Payer: COMMERCIAL

## 2025-05-21 ENCOUNTER — APPOINTMENT (OUTPATIENT)
Dept: ORTHOPEDIC SURGERY | Facility: HOSPITAL | Age: 50
End: 2025-05-21

## 2025-05-21 VITALS
HEART RATE: 64 BPM | TEMPERATURE: 97 F | DIASTOLIC BLOOD PRESSURE: 94 MMHG | SYSTOLIC BLOOD PRESSURE: 155 MMHG | WEIGHT: 249.12 LBS | OXYGEN SATURATION: 100 % | RESPIRATION RATE: 16 BRPM | HEIGHT: 70 IN

## 2025-05-21 DIAGNOSIS — Z98.890 OTHER SPECIFIED POSTPROCEDURAL STATES: Chronic | ICD-10-CM

## 2025-05-21 LAB — ABO RH CONFIRMATION: SIGNIFICANT CHANGE UP

## 2025-05-21 PROCEDURE — 27130 TOTAL HIP ARTHROPLASTY: CPT | Mod: RT

## 2025-05-21 PROCEDURE — 27130 TOTAL HIP ARTHROPLASTY: CPT | Mod: AS,RT

## 2025-05-21 PROCEDURE — 99222 1ST HOSP IP/OBS MODERATE 55: CPT

## 2025-05-21 PROCEDURE — 0055T BONE SRGRY CMPTR CT/MRI IMAG: CPT | Mod: RT

## 2025-05-21 PROCEDURE — 73502 X-RAY EXAM HIP UNI 2-3 VIEWS: CPT | Mod: 26,RT

## 2025-05-21 DEVICE — LINER ACET TRIDENT X3 0 DEG 40MM F: Type: IMPLANTABLE DEVICE | Site: RIGHT | Status: FUNCTIONAL

## 2025-05-21 DEVICE — MAKO BONE PIN 4MM X 170MM: Type: IMPLANTABLE DEVICE | Site: RIGHT | Status: FUNCTIONAL

## 2025-05-21 DEVICE — HEAD FEM BIOLOX CERAMIC 12/14 TAPR: Type: IMPLANTABLE DEVICE | Site: RIGHT | Status: FUNCTIONAL

## 2025-05-21 DEVICE — MAKO KNEE TIBIAL CHECKPOINT: Type: IMPLANTABLE DEVICE | Site: RIGHT | Status: FUNCTIONAL

## 2025-05-21 DEVICE — IMPLANTABLE DEVICE: Type: IMPLANTABLE DEVICE | Site: RIGHT | Status: FUNCTIONAL

## 2025-05-21 DEVICE — BONE WAX 2.5GM: Type: IMPLANTABLE DEVICE | Site: RIGHT | Status: FUNCTIONAL

## 2025-05-21 DEVICE — SHELL ACET TRIDENT II F 56MM: Type: IMPLANTABLE DEVICE | Site: RIGHT | Status: FUNCTIONAL

## 2025-05-21 RX ORDER — ASPIRIN 325 MG
81 TABLET ORAL
Refills: 0 | Status: DISCONTINUED | OUTPATIENT
Start: 2025-05-22 | End: 2025-05-22

## 2025-05-21 RX ORDER — CEFAZOLIN SODIUM IN 0.9 % NACL 3 G/100 ML
2000 INTRAVENOUS SOLUTION, PIGGYBACK (ML) INTRAVENOUS
Refills: 0 | Status: COMPLETED | OUTPATIENT
Start: 2025-05-21 | End: 2025-05-21

## 2025-05-21 RX ORDER — BISACODYL 5 MG
10 TABLET, DELAYED RELEASE (ENTERIC COATED) ORAL ONCE
Refills: 0 | Status: DISCONTINUED | OUTPATIENT
Start: 2025-05-23 | End: 2025-05-22

## 2025-05-21 RX ORDER — APREPITANT 40 MG/1
40 CAPSULE ORAL ONCE
Refills: 0 | Status: COMPLETED | OUTPATIENT
Start: 2025-05-21 | End: 2025-05-21

## 2025-05-21 RX ORDER — ONDANSETRON HCL/PF 4 MG/2 ML
4 VIAL (ML) INJECTION ONCE
Refills: 0 | Status: DISCONTINUED | OUTPATIENT
Start: 2025-05-21 | End: 2025-05-21

## 2025-05-21 RX ORDER — LOSARTAN POTASSIUM 100 MG/1
1 TABLET, FILM COATED ORAL
Refills: 0 | DISCHARGE

## 2025-05-21 RX ORDER — FENTANYL CITRATE-0.9 % NACL/PF 100MCG/2ML
25 SYRINGE (ML) INTRAVENOUS
Refills: 0 | Status: DISCONTINUED | OUTPATIENT
Start: 2025-05-21 | End: 2025-05-21

## 2025-05-21 RX ORDER — KETOROLAC TROMETHAMINE 30 MG/ML
15 INJECTION, SOLUTION INTRAMUSCULAR; INTRAVENOUS EVERY 6 HOURS
Refills: 0 | Status: COMPLETED | OUTPATIENT
Start: 2025-05-21 | End: 2025-05-22

## 2025-05-21 RX ORDER — ACETAMINOPHEN 500 MG/5ML
975 LIQUID (ML) ORAL ONCE
Refills: 0 | Status: COMPLETED | OUTPATIENT
Start: 2025-05-21 | End: 2025-05-21

## 2025-05-21 RX ORDER — EZETIMIBE 10 MG/1
1 TABLET ORAL
Refills: 0 | DISCHARGE

## 2025-05-21 RX ORDER — LEVOTHYROXINE SODIUM 300 MCG
300 TABLET ORAL DAILY
Refills: 0 | Status: DISCONTINUED | OUTPATIENT
Start: 2025-05-21 | End: 2025-05-22

## 2025-05-21 RX ORDER — ASPIRIN 325 MG
81 TABLET ORAL
Refills: 0 | Status: DISCONTINUED | OUTPATIENT
Start: 2025-05-21 | End: 2025-05-21

## 2025-05-21 RX ORDER — FENTANYL CITRATE-0.9 % NACL/PF 100MCG/2ML
50 SYRINGE (ML) INTRAVENOUS
Refills: 0 | Status: DISCONTINUED | OUTPATIENT
Start: 2025-05-21 | End: 2025-05-21

## 2025-05-21 RX ORDER — EZETIMIBE 10 MG/1
10 TABLET ORAL AT BEDTIME
Refills: 0 | Status: DISCONTINUED | OUTPATIENT
Start: 2025-05-21 | End: 2025-05-22

## 2025-05-21 RX ORDER — SENNA 187 MG
2 TABLET ORAL AT BEDTIME
Refills: 0 | Status: DISCONTINUED | OUTPATIENT
Start: 2025-05-21 | End: 2025-05-22

## 2025-05-21 RX ORDER — TRAMADOL HYDROCHLORIDE 50 MG/1
50 TABLET, FILM COATED ORAL EVERY 4 HOURS
Refills: 0 | Status: DISCONTINUED | OUTPATIENT
Start: 2025-05-21 | End: 2025-05-22

## 2025-05-21 RX ORDER — OXYCODONE HYDROCHLORIDE 30 MG/1
5 TABLET ORAL
Refills: 0 | Status: DISCONTINUED | OUTPATIENT
Start: 2025-05-21 | End: 2025-05-22

## 2025-05-21 RX ORDER — ROSUVASTATIN CALCIUM 20 MG/1
20 TABLET, FILM COATED ORAL AT BEDTIME
Refills: 0 | Status: DISCONTINUED | OUTPATIENT
Start: 2025-05-21 | End: 2025-05-22

## 2025-05-21 RX ORDER — LOSARTAN POTASSIUM 100 MG/1
100 TABLET, FILM COATED ORAL DAILY
Refills: 0 | Status: DISCONTINUED | OUTPATIENT
Start: 2025-05-22 | End: 2025-05-22

## 2025-05-21 RX ORDER — SODIUM CHLORIDE 9 G/1000ML
1000 INJECTION, SOLUTION INTRAVENOUS
Refills: 0 | Status: DISCONTINUED | OUTPATIENT
Start: 2025-05-21 | End: 2025-05-21

## 2025-05-21 RX ORDER — CELECOXIB 50 MG/1
200 CAPSULE ORAL EVERY 12 HOURS
Refills: 0 | Status: DISCONTINUED | OUTPATIENT
Start: 2025-05-23 | End: 2025-05-22

## 2025-05-21 RX ORDER — ACETAMINOPHEN 500 MG/5ML
1000 LIQUID (ML) ORAL ONCE
Refills: 0 | Status: COMPLETED | OUTPATIENT
Start: 2025-05-21 | End: 2025-05-22

## 2025-05-21 RX ORDER — ACETAMINOPHEN 500 MG/5ML
975 LIQUID (ML) ORAL EVERY 8 HOURS
Refills: 0 | Status: DISCONTINUED | OUTPATIENT
Start: 2025-05-21 | End: 2025-05-22

## 2025-05-21 RX ORDER — MAGNESIUM HYDROXIDE 400 MG/5ML
30 SUSPENSION ORAL DAILY
Refills: 0 | Status: DISCONTINUED | OUTPATIENT
Start: 2025-05-21 | End: 2025-05-22

## 2025-05-21 RX ORDER — OXYCODONE HYDROCHLORIDE 30 MG/1
10 TABLET ORAL
Refills: 0 | Status: DISCONTINUED | OUTPATIENT
Start: 2025-05-21 | End: 2025-05-22

## 2025-05-21 RX ADMIN — Medication 975 MILLIGRAM(S): at 22:19

## 2025-05-21 RX ADMIN — Medication 150 MILLILITER(S): at 15:36

## 2025-05-21 RX ADMIN — KETOROLAC TROMETHAMINE 15 MILLIGRAM(S): 30 INJECTION, SOLUTION INTRAMUSCULAR; INTRAVENOUS at 18:55

## 2025-05-21 RX ADMIN — Medication 3 MILLILITER(S): at 22:06

## 2025-05-21 RX ADMIN — Medication 50 MICROGRAM(S): at 14:30

## 2025-05-21 RX ADMIN — Medication 50 MICROGRAM(S): at 14:00

## 2025-05-21 RX ADMIN — Medication 2 TABLET(S): at 22:19

## 2025-05-21 RX ADMIN — EZETIMIBE 10 MILLIGRAM(S): 10 TABLET ORAL at 22:19

## 2025-05-21 RX ADMIN — Medication 3 MILLILITER(S): at 14:22

## 2025-05-21 RX ADMIN — Medication 81 MILLIGRAM(S): at 17:55

## 2025-05-21 RX ADMIN — KETOROLAC TROMETHAMINE 15 MILLIGRAM(S): 30 INJECTION, SOLUTION INTRAMUSCULAR; INTRAVENOUS at 23:19

## 2025-05-21 RX ADMIN — KETOROLAC TROMETHAMINE 15 MILLIGRAM(S): 30 INJECTION, SOLUTION INTRAMUSCULAR; INTRAVENOUS at 17:55

## 2025-05-21 RX ADMIN — Medication 50 MICROGRAM(S): at 14:15

## 2025-05-21 RX ADMIN — Medication 2000 MILLIGRAM(S): at 17:55

## 2025-05-21 RX ADMIN — Medication 975 MILLIGRAM(S): at 23:19

## 2025-05-21 RX ADMIN — ROSUVASTATIN CALCIUM 20 MILLIGRAM(S): 20 TABLET, FILM COATED ORAL at 22:19

## 2025-05-21 RX ADMIN — Medication 2000 MILLIGRAM(S): at 22:18

## 2025-05-21 RX ADMIN — APREPITANT 40 MILLIGRAM(S): 40 CAPSULE ORAL at 09:04

## 2025-05-21 RX ADMIN — OXYCODONE HYDROCHLORIDE 5 MILLIGRAM(S): 30 TABLET ORAL at 15:34

## 2025-05-21 RX ADMIN — Medication 975 MILLIGRAM(S): at 09:05

## 2025-05-21 RX ADMIN — KETOROLAC TROMETHAMINE 15 MILLIGRAM(S): 30 INJECTION, SOLUTION INTRAMUSCULAR; INTRAVENOUS at 22:19

## 2025-05-21 RX ADMIN — Medication 500 MILLILITER(S): at 14:30

## 2025-05-21 RX ADMIN — OXYCODONE HYDROCHLORIDE 5 MILLIGRAM(S): 30 TABLET ORAL at 16:30

## 2025-05-21 NOTE — DISCHARGE NOTE PROVIDER - CARE PROVIDER_API CALL
Jeanmarie Olmedo  Joint Reconstruction  [address], [address] [place] [address]  Phone: [phone]  Fax: [phone]  Follow Up Time:

## 2025-05-21 NOTE — DISCHARGE NOTE NURSING/CASE MANAGEMENT/SOCIAL WORK - PATIENT PORTAL LINK FT
You can access the FollowMyHealth Patient Portal offered by [facility] by registering at the following website: [email or web address]. By joining [employer]’s FollowMyHealth portal, you will also be able to view your health information using other applications (apps) compatible with our system.

## 2025-05-21 NOTE — DISCHARGE NOTE NURSING/CASE MANAGEMENT/SOCIAL WORK - FINANCIAL ASSISTANCE
[facility] provides services at a reduced cost to those who are determined to be eligible through [facility]’s financial assistance program. Information regarding [facility]’s financial assistance program can be found by going to [email or web address] or by calling [phone].

## 2025-05-21 NOTE — PATIENT PROFILE ADULT - FALL HARM RISK - HARM RISK INTERVENTIONS
Assistance with ambulation/Assistance OOB with selected safe patient handling equipment/Communicate Risk of Fall with Harm to all staff/Discuss with provider need for PT consult/Monitor gait and stability/Provide patient with walking aids - walker, cane, crutches/Reinforce activity limits and safety measures with patient and family/Sit up slowly, dangle for a short time, stand at bedside before walking/Tailored Fall Risk Interventions/Use of alarms - bed, chair and/or voice tab/Visual Cue: Yellow wristband and red socks/Bed in lowest position, wheels locked, appropriate side rails in place/Call bell, personal items and telephone in reach/Instruct patient to call for assistance before getting out of bed or chair/Non-slip footwear when patient is out of bed/[place] to call system/Physically safe environment - no spills, clutter or unnecessary equipment/Purposeful Proactive Rounding/Room/bathroom lighting operational, light cord in reach

## 2025-05-21 NOTE — PATIENT PROFILE ADULT - NSPROMEDSADMININFO_GEN_A_NUR
30 DAY STM VISIT    Diagnostic AHI: 30.9 PSG    Message left for patient to return call     Assessment: Pt meeting objective benchmarks.     Action plan: waiting for patient to return call and pt to have 6 month STM visit  Patient needs to schedule a follow up visit with Dr. Nguyễn.   Device type: Auto-CPAP  PAP settings: CPAP min 5.0 cm  H20     CPAP max 15.0 cm  H20    95th% pressure 13.6 cm  H20   Mask type:  Nasal Pillows  Objective measures: 14 day rolling measures      Compliance  100 %      Leak  22.56 lpm  last  upload      AHI 1.53   last  upload      Average number of minutes 448      Objective measure goal  Compliance   Goal >70%  Leak   Goal < 24 lpm  AHI  Goal < 5  Usage  Goal >240  
no concerns

## 2025-05-21 NOTE — CONSULT NOTE ADULT - SUBJECTIVE AND OBJECTIVE BOX
CC: Right hip pain    HPI:  50 year old male with history of hypothyroidism, HLD, HTN presents with c/o of right hip pain for about 1 year.  Patient reported his hip pain started localized to the groin and was worse with deep ROM, as well as with weightbearing activity. Patient works in construction.  Pain had become worse to outer hip and had pain with sitting.  Patient reported some relief with laying down and standing. Patient took Naproxen with mild relief. Ambulated without assistance. 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 Patient admitted to drinking daily beer for several years but had stopped > 1 month ago since diagnosis. Patient now s/p right JASBIR POD#0.        PAST MEDICAL & SURGICAL HISTORY:  Hypertension  HLD (hyperlipidemia)  Hypothyroidism  History of endoscopy  H/O arthroscopy of left knee    FAMILY HISTORY:    SOCIAL HISTORY:  Tobacco -   ETOH -   Drug use -     ALLERGIES:  NKDA    HOME MEDICATIONS:  ezetimibe 10 mg oral tablet: 1 tab(s) orally once a day (at bedtime) (21 May 2025 08:54)  losartan 100 mg oral tablet: 1 tab(s) orally once a day (21 May 2025 08:54)  naproxen 500 mg oral tablet: 1 tab(s) orally as needed for  moderate pain (21 May 2025 08:54)  ROSUVASTATIN CALCIUM 20 MG TAB: orally once a day (at bedtime) (21 May 2025 08:54)  SYNTHROID 300 MCG TABLET: orally once a day (21 May 2025 08:54)    MEDICATIONS  (STANDING):  acetaminophen     Tablet .. 975 milliGRAM(s) Oral every 8 hours  acetaminophen   IVPB .. 1000 milliGRAM(s) IV Intermittent once  aspirin enteric coated 81 milliGRAM(s) Oral two times a day  ceFAZolin  Injectable. 2000 milliGRAM(s) IV Push <User Schedule>  ezetimibe 10 milliGRAM(s) Oral at bedtime  ketorolac   Injectable 15 milliGRAM(s) IV Push every 6 hours  lactated ringers. 1000 milliLiter(s) (75 mL/Hr) IV Continuous <Continuous>  levothyroxine 300 MICROGram(s) Oral daily  pantoprazole    Tablet 40 milliGRAM(s) Oral before breakfast  rosuvastatin 20 milliGRAM(s) Oral at bedtime  senna 2 Tablet(s) Oral at bedtime  sodium chloride 0.9% Bolus 500 milliLiter(s) IV Bolus once  sodium chloride 0.9% lock flush 3 milliLiter(s) IV Push every 8 hours  sodium chloride 0.9%. 1000 milliLiter(s) (150 mL/Hr) IV Continuous <Continuous>    MEDICATIONS  (PRN):  fentaNYL    Injectable 25 MICROGram(s) IV Push every 5 minutes PRN Moderate Pain (4 - 6)  fentaNYL    Injectable 50 MICROGram(s) IV Push every 5 minutes PRN Severe Pain (7 - 10)  magnesium hydroxide Suspension 30 milliLiter(s) Oral daily PRN Constipation  ondansetron Injectable 4 milliGRAM(s) IV Push once PRN Nausea and/or Vomiting  oxyCODONE    IR 5 milliGRAM(s) Oral every 3 hours PRN Moderate Pain (4 - 6)  oxyCODONE    IR 10 milliGRAM(s) Oral every 3 hours PRN Severe Pain (7 - 10)  traMADol 50 milliGRAM(s) Oral every 4 hours PRN Mild Pain (1 - 3)      REVIEW OF SYSTEMS      General:	    Skin/Breast:  	  Ophthalmologic:  	  ENMT:	    Respiratory and Thorax:  	  Cardiovascular:	    Gastrointestinal:	    Genitourinary:	    Musculoskeletal:	    Neurological:	    Psychiatric:	    Hematology/Lymphatics:	    Endocrine:	    Allergic/Immunologic:	      Vital Signs Last 24 Hrs  T(C): 36.3 (21 May 2025 13:45), Max: 36.3 (21 May 2025 08:48)  T(F): 97.3 (21 May 2025 13:45), Max: 97.3 (21 May 2025 08:48)  HR: 64 (21 May 2025 14:30) (50 - 64)  BP: 129/88 (21 May 2025 14:30) (120/87 - 155/94)  BP(mean): --  RR: 14 (21 May 2025 14:30) (12 - 16)  SpO2: 100% (21 May 2025 14:30) (100% - 100%)    Parameters below as of 21 May 2025 14:15  Patient On (Oxygen Delivery Method): nasal cannula  O2 Flow (L/min): 2      PHYSICAL EXAM:      Constitutional:    Eyes:    ENMT:    Neck:    Breasts:    Back:    Respiratory:    Cardiovascular:    Gastrointestinal:    Genitourinary:    Rectal:    Extremities:    Vascular:    Neurological:    Skin:    Lymph Nodes:    Musculoskeletal:    Psychiatric:       CC: Right hip pain    HPI:  50 year old male with history of hypothyroidism, HLD, HTN presents with c/o of right hip pain for about 1 year.  Patient reported his hip pain started localized to the groin and was worse with deep ROM, as well as with weightbearing activity. Patient works in construction.  Pain had become worse to outer hip and had pain with sitting.  Patient reported some relief with laying down and standing. Patient took Naproxen with mild relief. Ambulated without assistance. 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 Patient admitted to drinking daily beer for several years but had stopped > 1 month ago since diagnosis. Patient now s/p right JASBIR POD#0.  Patient seen and examined in PACU.  Pain controlled.      PAST MEDICAL & SURGICAL HISTORY:  Hypertension  HLD (hyperlipidemia)  Hypothyroidism  History of endoscopy  H/O arthroscopy of left knee    FAMILY HISTORY:  FH: Cancer - Mother    SOCIAL HISTORY:  Tobacco - Quit many years ago  ETOH - 1-2beers daily, quit 2months ago  Drug use - THC - Stopped 2 weeks ago    ALLERGIES:  NKDA    HOME MEDICATIONS:  ezetimibe 10 mg oral tablet: 1 tab(s) orally once a day (at bedtime) (21 May 2025 08:54)  losartan 100 mg oral tablet: 1 tab(s) orally once a day (21 May 2025 08:54)  naproxen 500 mg oral tablet: 1 tab(s) orally as needed for  moderate pain (21 May 2025 08:54)  ROSUVASTATIN CALCIUM 20 MG TAB: orally once a day (at bedtime) (21 May 2025 08:54)  SYNTHROID 300 MCG TABLET: orally once a day (21 May 2025 08:54)    MEDICATIONS  (STANDING):  acetaminophen     Tablet .. 975 milliGRAM(s) Oral every 8 hours  acetaminophen   IVPB .. 1000 milliGRAM(s) IV Intermittent once  aspirin enteric coated 81 milliGRAM(s) Oral two times a day  ceFAZolin  Injectable. 2000 milliGRAM(s) IV Push <User Schedule>  ezetimibe 10 milliGRAM(s) Oral at bedtime  ketorolac   Injectable 15 milliGRAM(s) IV Push every 6 hours  lactated ringers. 1000 milliLiter(s) (75 mL/Hr) IV Continuous <Continuous>  levothyroxine 300 MICROGram(s) Oral daily  pantoprazole    Tablet 40 milliGRAM(s) Oral before breakfast  rosuvastatin 20 milliGRAM(s) Oral at bedtime  senna 2 Tablet(s) Oral at bedtime  sodium chloride 0.9% Bolus 500 milliLiter(s) IV Bolus once  sodium chloride 0.9% lock flush 3 milliLiter(s) IV Push every 8 hours  sodium chloride 0.9%. 1000 milliLiter(s) (150 mL/Hr) IV Continuous <Continuous>    MEDICATIONS  (PRN):  fentaNYL    Injectable 25 MICROGram(s) IV Push every 5 minutes PRN Moderate Pain (4 - 6)  fentaNYL    Injectable 50 MICROGram(s) IV Push every 5 minutes PRN Severe Pain (7 - 10)  magnesium hydroxide Suspension 30 milliLiter(s) Oral daily PRN Constipation  ondansetron Injectable 4 milliGRAM(s) IV Push once PRN Nausea and/or Vomiting  oxyCODONE    IR 5 milliGRAM(s) Oral every 3 hours PRN Moderate Pain (4 - 6)  oxyCODONE    IR 10 milliGRAM(s) Oral every 3 hours PRN Severe Pain (7 - 10)  traMADol 50 milliGRAM(s) Oral every 4 hours PRN Mild Pain (1 - 3)      REVIEW OF SYSTEMS:  CONSTITUTIONAL: No fever, weight loss, or fatigue  EYES: No eye pain, visual disturbances, or discharge  NECK: No pain or stiffness  RESPIRATORY: No cough, wheezing, or chills; No shortness of breath  CARDIOVASCULAR: No chest pain, palpitations, dizziness, or leg swelling  GASTROINTESTINAL: No abdominal or epigastric pain. No nausea or vomiting; No diarrhea or constipation.   GENITOURINARY: No dysuria, frequency, hematuria, or incontinence  NEUROLOGICAL: No headaches, memory loss, loss of strength, numbness, or tremors  SKIN: No itching, burning, rashes, or lesions   MUSCULOSKELETAL: Right hip pain  PSYCHIATRIC: No depression, anxiety, mood swings, or difficulty sleeping      Vital Signs Last 24 Hrs  T(C): 36.3 (21 May 2025 13:45), Max: 36.3 (21 May 2025 08:48)  T(F): 97.3 (21 May 2025 13:45), Max: 97.3 (21 May 2025 08:48)  HR: 64 (21 May 2025 14:30) (50 - 64)  BP: 129/88 (21 May 2025 14:30) (120/87 - 155/94)  BP(mean): --  RR: 14 (21 May 2025 14:30) (12 - 16)  SpO2: 100% (21 May 2025 14:30) (100% - 100%)    Parameters below as of 21 May 2025 14:15  Patient On (Oxygen Delivery Method): nasal cannula  O2 Flow (L/min): 2      PHYSICAL EXAM:  GENERAL: NAD  HEAD:  Atraumatic, Normocephalic  NECK: Supple, No JVD, Normal thyroid  NERVOUS SYSTEM:  Alert & Oriented X3, Good concentration; Motor Strength 5/5 B/L upper and lower extremities  CHEST/LUNG: Clear to auscultation bilaterally  HEART: Regular rate and rhythm; No murmurs, rubs, or gallops  ABDOMEN: Soft, Nontender, Nondistended; Bowel sounds present  EXTREMITIES:  2+ Peripheral Pulses, Right hip with clean dressing  SKIN: No rashes or lesions

## 2025-05-21 NOTE — DISCHARGE NOTE PROVIDER - NSDCFUSCHEDAPPT_GEN_ALL_CORE_FT
Jeanmarie Olmedo  [facility]  ORTHOSURG [address]  Scheduled Appointment: 06/12/2025    [facility]  ORTHOSURG [address]  Scheduled Appointment: 07/02/2025

## 2025-05-21 NOTE — DISCHARGE NOTE PROVIDER - NSDCMRMEDTOKEN_GEN_ALL_CORE_FT
ezetimibe 10 mg oral tablet: 1 tab(s) orally once a day (at bedtime)  losartan 100 mg oral tablet: 1 tab(s) orally once a day  naproxen 500 mg oral tablet: 1 tab(s) orally as needed for  moderate pain  ROSUVASTATIN CALCIUM 20 MG TAB: orally once a day (at bedtime)  SYNTHROID 300 MCG TABLET: orally once a day   acetaminophen 325 mg oral tablet: 3 tab(s) orally every 8 hours cont x 1 week then as needed MDD: 9  Aspirin EC 81 mg oral delayed release tablet: 1 tab(s) orally 2 times a day Continue for 6 weeks postop - last dose one 7/2/25 MDD: 2  CeleBREX 200 mg oral capsule: 1 cap(s) orally 2 times a day continue for 3 weeks postop - begin on 5/23/25 - last dose on 6/12/25 MDD: 2  ezetimibe 10 mg oral tablet: 1 tab(s) orally once a day (at bedtime)  levothyroxine 300 mcg (0.3 mg) oral tablet: 1 tab(s) orally once a day  losartan 100 mg oral tablet: 1 tab(s) orally once a day  Narcan 4 mg/0.1 mL nasal spray: 4 milligram(s) intranasally once as needed for over-sedation MDD: 1  omeprazole 20 mg oral delayed release capsule: 1 cap(s) orally once a day continue x 6 weeks postop - last dose on 7/2/25  oxyCODONE 5 mg oral tablet: 1 tab(s) orally every 4 to 6 hours as needed for  severe pain MDD: 4  ROSUVASTATIN CALCIUM 20 MG TAB: orally once a day (at bedtime)  Senna S 50 mg-8.6 mg oral tablet: 2 tab(s) orally once a day (at bedtime) as needed for  constipation MDD: 2

## 2025-05-21 NOTE — PATIENT PROFILE ADULT - DO YOU EVER NEED HELP READING HOSPITAL MATERIALS?
ANTICOAGULATION     Shira Rodriguez is overdue for INR check.      Left message for patient to call and schedule lab appointment as soon as possible. If returning call, please schedule.     Patience Martins RN    
no

## 2025-05-21 NOTE — PROVIDER CONTACT NOTE (OTHER) - SITUATION
Attended joint education session on 5/15/2025 via online method with opportunity to ask questions. Contact information for follow up questions given.

## 2025-05-21 NOTE — CONSULT NOTE ADULT - ASSESSMENT
50 year old male with history of hypothyroidism, HLD, HTN presents with c/o of right hip pain for about 1 year.  Patient reported his hip pain started localized to the groin and was worse with deep ROM, as well as with weightbearing activity. Patient works in construction.  Pain had become worse to outer hip and had pain with sitting.  Patient reported some relief with laying down and standing. Patient took Naproxen with mild relief. Ambulated without assistance. 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 Patient admitted to drinking daily beer for several years but had stopped > 1 month ago since diagnosis. Patient now s/p right JASBIR POD#0.     Right hip Avascular Necrosis  S/p Right JASBIR POD #0.  Pain control.  PT/OT.  Antibiotics, wound care and DVT prophylaxis as per Ortho.  Incentive spirometry.  Avoid opioid induced constipation.    Hypothyroid  Continue Levothyroxine.    HLD  Continue statin and Zetia.    HTN  Resume Losartan on POD#2.  Monitor BP.    DVT prophylaxis  ASA BID as per Ortho. 50 year old male with history of hypothyroidism, HLD, HTN presents with c/o of right hip pain for about 1 year.  Patient reported his hip pain started localized to the groin and was worse with deep ROM, as well as with weightbearing activity.  Patient now s/p right JASBIR POD#0.     Assessment/Plan:    Right hip Avascular Necrosis  S/p Right JASBIR POD #0.  Pain control.  PT/OT.  Antibiotics, wound care and DVT prophylaxis as per Ortho.  Incentive spirometry.  Avoid opioid induced constipation.    Hypothyroid  Continue Levothyroxine 300mcg 1 tab PO ODd    HLD  Continue statin and Zetia.    HTN  Resume Losartan on POD#2.  Monitor BP.    DVT prophylaxis  ASA BID as per Ortho.

## 2025-05-21 NOTE — CONSULT NOTE ADULT - NS ATTEND AMEND GEN_ALL_CORE FT
Patient seen and examined, agree with the above assessment and plan. Note edited where appropriate. Patient laying comfortably in bed pain controlled.

## 2025-05-21 NOTE — PHYSICAL THERAPY INITIAL EVALUATION ADULT - GENERAL OBSERVATIONS, REHAB EVAL
Pt received in bed + IV loc + VCBs, breathing on RA in NAD, in 6/10 pain in right hip, wife present, breathing on RA in NAD, agreeable to PT evaluation

## 2025-05-21 NOTE — DISCHARGE NOTE NURSING/CASE MANAGEMENT/SOCIAL WORK - NSDCPEFALRISK_GEN_ALL_CORE
For information on Fall & Injury Prevention, visit: [email or web address] OR  [email or web address] OR  https://www.cdc.gov/steadi/patient.html

## 2025-05-21 NOTE — DISCHARGE NOTE PROVIDER - NSDCFUADDINST_GEN_ALL_CORE_FT
The patient will be seen in the office between 2-3 weeks for wound check.   **Your first post-operative visit has been scheduled prior to your admission. PLEASE CONTACT OFFICE TO CONFIRM THE APPOINTMENT DATE. Sutures/Staples/Tape will be removed at that time.  **  The silver based dressing is to be removed 7 days from the date of surgery.   ** CONTACT THE OFFICE IF THE FOLLOWING DEVELOP:  - the dressing becomes soiled or saturated  - you develop a fever greater that 101F  - the wound becomes red or you develop blistering around the wound  * Patient may shower after post-op day #3.   * The patient will continue home PT consistent with anterior total hip replacement protocol and will continue to practice anterior total hip precautions for a minimum of 6 week. Transition to outpatient PT will occur at the time of the first office visit.   * The patient is FULL weight bearing.  * The patient will continue ASPIRIN for 6 weeks after surgery for blood clot prevention.  ** While on aspirin, the patient will take daily omeprazole or other similar medication to protect the stomach from irritation.   * The patient will take OXYCODONE as prescribed as needed for pain. Patient is also to take TYLENOL 325mg 3 tabs by mouth every 8 hrs for 1 week then continue as needed for pain control. Adjust according to prescription and patient needs. Contact the office if pain increases while taking prescribed pain medications or related concerns develop.   * Celebrex will be taken twice daily for 3 weeks for pain control and prevention of excessive bone growth. Additional prescription may be requested at your office follow-up visit.  * The patient will take Senna S while taking oxycodone to prevent narcotic associated constipation.  Additionally, increase water intake (drink at least 8 glasses of water daily) and try adding fiber to the diet by eating fruits, vegetables and foods that are rich in grains. If constipation is experienced, contact the medical/primary care provider to discuss further treatment options.  * To avoid injury at home:  - continue use of rolling walker until cleared by physical therapist  - have family or friend remove all throw rug or objects in hallways that may present a trip hazard.  - if you experience any dizziness or medical concerns, call your medical doctor or  911.  * The implant may activate metal detection devices.   The patient will be seen in the office between 2-3 weeks for wound check.   **Your first post-operative visit has been scheduled prior to your admission. PLEASE CONTACT OFFICE TO CONFIRM THE APPOINTMENT DATE. Sutures/Staples/Tape will be removed at that time.  **  The silver based dressing is to be removed 7 days from the date of surgery.   ** CONTACT THE OFFICE IF THE FOLLOWING DEVELOP:  - the dressing becomes soiled or saturated  - you develop a fever greater that 101F  - the wound becomes red or you develop blistering around the wound  * Patient may shower after post-op day #3.   * The patient will continue home PT consistent with anterior total hip replacement protocol and will continue to practice anterior total hip precautions for a minimum of 6 week. Transition to outpatient PT will occur at the time of the first office visit.   * The patient is FULL weight bearing.  * The patient will continue ASPIRIN for 6 weeks after surgery for blood clot prevention.  ** While on aspirin, the patient will take daily omeprazole or other similar medication to protect the stomach from irritation.   * The patient will take OXYCODONE as prescribed as needed for pain. Patient is also to take TYLENOL 325mg 3 tabs by mouth every 8 hrs for 1 week then continue as needed for pain control. Adjust according to prescription and patient needs. Contact the office if pain increases while taking prescribed pain medications or related concerns develop.   * Celebrex will be taken twice daily for 3 weeks for pain control and prevention of excessive bone growth. Additional prescription may be requested at your office follow-up visit.  * The patient will take Senna S while taking oxycodone to prevent narcotic associated constipation.  Additionally, increase water intake (drink at least 8 glasses of water daily) and try adding fiber to the diet by eating fruits, vegetables and foods that are rich in grains. If constipation is experienced, contact the medical/primary care provider to discuss further treatment options.  * To avoid injury at home:  - continue use of rolling walker until cleared by physical therapist  - have family or friend remove all throw rug or objects in hallways that may present a trip hazard.  - if you experience any dizziness or medical concerns, call your medical doctor or  911.  * The implant may activate metal detection devices.  * Liver enzymes mildy elevated at pre-surgical testing. Patient being discharges with tylenol for pain, recommend repeat liver function tests outpatient with primary care doctor.

## 2025-05-21 NOTE — DISCHARGE NOTE PROVIDER - HOSPITAL COURSE
The patient underwent a right ANTERIOR TOTAL HIP REPLACEMENT on 5/21/25. The patient received antibiotics consistent with SCIP guidelines. The patient underwent the procedure and had no intra-operative complications. Post-operatively, the patient was seen by medicine and PT. The patient received Aspirin for DVTP. The patient received pain medications per orthopedic pain managment protocol and the pain was appropriately controlled. Patient was instructed on anterior total hip precautions by PT. The patient did not have any post-operative medical complications. The patient was discharged in stable condition.

## 2025-05-21 NOTE — PHYSICAL THERAPY INITIAL EVALUATION ADULT - ADDITIONAL COMMENTS
pt reports living in private home with wife and son who are able to assist. Pt has 3 MANUEL with no handrail and flight of stairs inside with handrail. Independent prior to admission. Owns RW, cane, shower chair, commode

## 2025-05-21 NOTE — DISCHARGE NOTE PROVIDER - NSDCCPCAREPLAN_GEN_ALL_CORE_FT
PRINCIPAL DISCHARGE DIAGNOSIS  Diagnosis: Avascular necrosis of hip, right  Assessment and Plan of Treatment:

## 2025-05-21 NOTE — PHYSICAL THERAPY INITIAL EVALUATION ADULT - REFERRAL TO ANOTHER SERVICE NEEDED, PT EVAL
Referred by: Dora Rocha DO; Medical Diagnosis (from order):    Diagnosis Information      Diagnosis    724.2, 724.3 (ICD-9-CM) - M54.41 (ICD-10-CM) - Acute right-sided low back pain with right-sided sciatica                Physical Therapy -  Daily Treatment Note    Visit:  9     SUBJECTIVE                                                                                                             Doctor ordered an MRI; waiting for a call to schedule.  Denies pain sitting currently.  Denies any radicular symptoms currently.  Reports pain from walking to standing.  Traction doesn't seem to be helping with pain.  Reported pain with going from standing to sitting on the bike.  Saw the Chiropractor this morning; felt good after.     Pain / Symptoms:  Pain rating (out of 10): Current: 0     OBJECTIVE                                                                                                                        TREATMENT                                                                                                                  Therapeutic Exercise:  Recumbent bike workload 1 x 6 minutes; seat 13    Supine  Piriformis stretch 30 seconds x 2 Right   Sciatic nerve mobilization x 10  Hip flexor stretch 30 seconds x 2 Right; some discomfort with lifting leg back on table  Lower trunk rotation in NWB 15 seconds x 5 Bilateral   Pelvic tilts with verbal and tactile cues for form x 10  Transverse abdominas bracing in hook-lye x 10; 3 sets with cues for technique    Seated:  Transverse abdominas bracing x 10  Transverse abdominas bracing with marching x 3 Bilateral; stopped due to groin pain  Transverse abdominas bracing with long arc quads x 10 alternating  Pelvic tilts x 10  Transfer from is to/from stand x 5 with Transverse abdominas bracing with cues for neutral position    Prone 60 sec    Prone on elbows 30 sec hold x 3      Manual Therapy:  Prone  PA mobilizations in prone grade II-III to L3-5    Therapeutic  Activity:  Instructed in log rolling on mat table and transfer from supine to sit to assist with decreasing pain with bed mobility.  Reported some discomfort with \"scooting\" bottom.     Skilled input: verbal instruction/cues, tactile instruction/cues and posture correction    Writer verbally educated and received verbal consent for hand placement, positioning of patient, and techniques to be performed today from patient for therapist position for techniques and hand placement and palpation for techniques as described above and how they are pertinent to the patient's plan of care.    Home Exercise Program: Access Code: [email or web address]   Eval: lay prone over/without pillow, walking  9/17/2020: prone, prone on elbows   9/18/2020: figure 4 while prone, prone on elbows  9/21/2020: lay prone/figure 4, on elbows every 1-2 hours  9/23/2020: piriformis stretch  9/29/20: standing lateral shift to the Right 10 seconds holds x 5; 1-2 x/day  10/6/20: Transverse abdominas bracing      ASSESSMENT                                                                                                             Held mechanical traction this session as Lalo has reported no change in pain and radicular symptoms.  Reported less pain coming into session with no radicular symptoms.  Focused on improving lumbar mobility and core strengthening and instructed in technique for bed mobility and transfer training.  Reported tension to buttock with \"scooting\" butt on mat table and with transfers to/from sitting to standing.  Spent extra time on instruction in Transverse abdominas bracing; issued exercise for Home Exercise Program with cues to complete in different positions.  Reported slight pain when leaving session but no radicular pain.  Encouraged ice/heat as needed for pain control.   Pain/symptoms after session: 3    Patient Education:   Results of above outlined education: Verbalizes understanding and Needs reinforcement      PLAN                                                                                                                            Suggestions for next session as indicated: Progress per plan of care focus on core strengthening in sitting and standing, neutral position, lumbar mobility         Procedures and total treatment time documented Time Entry flowsheet.   occupational therapy

## 2025-05-22 VITALS
OXYGEN SATURATION: 98 % | DIASTOLIC BLOOD PRESSURE: 76 MMHG | TEMPERATURE: 98 F | SYSTOLIC BLOOD PRESSURE: 147 MMHG | RESPIRATION RATE: 18 BRPM | HEART RATE: 74 BPM

## 2025-05-22 PROCEDURE — 97116 GAIT TRAINING THERAPY: CPT

## 2025-05-22 PROCEDURE — C1889: CPT

## 2025-05-22 PROCEDURE — C1776: CPT

## 2025-05-22 PROCEDURE — 97110 THERAPEUTIC EXERCISES: CPT

## 2025-05-22 PROCEDURE — S2900: CPT

## 2025-05-22 PROCEDURE — 99232 SBSQ HOSP IP/OBS MODERATE 35: CPT

## 2025-05-22 PROCEDURE — 73502 X-RAY EXAM HIP UNI 2-3 VIEWS: CPT

## 2025-05-22 PROCEDURE — 36415 COLL VENOUS BLD VENIPUNCTURE: CPT

## 2025-05-22 PROCEDURE — C1713: CPT

## 2025-05-22 RX ORDER — ACETAMINOPHEN 500 MG/5ML
3 LIQUID (ML) ORAL
Qty: 90 | Refills: 0
Start: 2025-05-22

## 2025-05-22 RX ORDER — LEVOTHYROXINE SODIUM 300 MCG
1 TABLET ORAL
Qty: 0 | Refills: 0 | DISCHARGE
Start: 2025-05-22

## 2025-05-22 RX ORDER — NAPROXEN SODIUM 275 MG
1 TABLET ORAL
Refills: 0 | DISCHARGE

## 2025-05-22 RX ORDER — CELECOXIB 50 MG/1
1 CAPSULE ORAL
Qty: 42 | Refills: 0
Start: 2025-05-22 | End: 2025-06-11

## 2025-05-22 RX ORDER — OMEPRAZOLE 20 MG/1
1 CAPSULE, DELAYED RELEASE ORAL
Qty: 42 | Refills: 0
Start: 2025-05-22 | End: 2025-07-02

## 2025-05-22 RX ORDER — SENNOSIDES, DOCUSATE SODIUM 8.6; 5 MG/1; MG/1
2 TABLET ORAL
Qty: 20 | Refills: 0
Start: 2025-05-22

## 2025-05-22 RX ORDER — NALOXONE HYDROCHLORIDE 0.4 MG/ML
4 INJECTION, SOLUTION INTRAMUSCULAR; INTRAVENOUS; SUBCUTANEOUS
Qty: 1 | Refills: 0
Start: 2025-05-22

## 2025-05-22 RX ORDER — OXYCODONE HYDROCHLORIDE 30 MG/1
1 TABLET ORAL
Qty: 28 | Refills: 0
Start: 2025-05-22

## 2025-05-22 RX ORDER — ASPIRIN 325 MG
1 TABLET ORAL
Qty: 84 | Refills: 0
Start: 2025-05-22 | End: 2025-07-02

## 2025-05-22 RX ADMIN — Medication 3 MILLILITER(S): at 04:20

## 2025-05-22 RX ADMIN — Medication 40 MILLIGRAM(S): at 05:05

## 2025-05-22 RX ADMIN — Medication 1000 MILLIGRAM(S): at 06:05

## 2025-05-22 RX ADMIN — Medication 300 MICROGRAM(S): at 05:04

## 2025-05-22 RX ADMIN — Medication 400 MILLIGRAM(S): at 05:05

## 2025-05-22 RX ADMIN — KETOROLAC TROMETHAMINE 15 MILLIGRAM(S): 30 INJECTION, SOLUTION INTRAMUSCULAR; INTRAVENOUS at 06:04

## 2025-05-22 RX ADMIN — KETOROLAC TROMETHAMINE 15 MILLIGRAM(S): 30 INJECTION, SOLUTION INTRAMUSCULAR; INTRAVENOUS at 05:04

## 2025-05-22 RX ADMIN — Medication 81 MILLIGRAM(S): at 05:05

## 2025-05-22 NOTE — PROGRESS NOTE ADULT - NS ATTEND AMEND GEN_ALL_CORE FT
Patient seen and examined, agree with the above assessment and plan. Note edited where appropriate. Pain controlled. Ambulating well with PT. Medically stable for discharge home when cleared by PT and ortho

## 2025-05-22 NOTE — PROGRESS NOTE ADULT - ASSESSMENT
50 year old male with history of hypothyroidism, HLD, HTN presents with c/o of right hip pain for about 1 year.  Patient reported his hip pain started localized to the groin and was worse with deep ROM, as well as with weightbearing activity.  Patient now s/p right JASBIR POD#1.     Assessment/Plan:    Right hip Avascular Necrosis  S/p Right JASBIR POD #1.  Pain control.  PT/OT.  Antibiotics, wound care and DVT prophylaxis as per Ortho.  Incentive spirometry.  Avoid opioid induced constipation.    Hypothyroid  Continue Levothyroxine 300mcg 1 tab PO daily    HLD  Continue statin and Zetia.    HTN  Resume Losartan on POD#2.  Monitor BP.    Mildly elevated LFT's   Noted on PST.  Would repeat LFTs outpatient with primary doctor.    DVT prophylaxis  ASA BID as per Ortho.    Patient medically stable for discharge pending Ortho and PT.

## 2025-05-22 NOTE — PROGRESS NOTE ADULT - SUBJECTIVE AND OBJECTIVE BOX
CC: right JASBIR (21 May 2025 13:22)    HPI:  50 year old male with history of hypothyroidism, HLD, HTN presents with c/o of right hip pain for about 1 year.  Patient reported his hip pain started localized to the groin and was worse with deep ROM, as well as with weightbearing activity. Patient works in construction.  Pain had become worse to outer hip and had pain with sitting.  Patient reported some relief with laying down and standing. Patient took Naproxen with mild relief. Ambulated without assistance. 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 Patient admitted to drinking daily beer for several years but had stopped > 1 month ago since diagnosis. Patient now s/p right JASBIR POD#1.      INTERVAL HPI/OVERNIGHT EVENTS: Patient seen and examined sitting up in the chair.  Patient reports pain controlled.  Tolerated PT.  Patient denies any headache, dizziness, SOB, CP, abdominal pain, nausea, vomiting, dysuria.  Other ROS reviewed and are negative.    Vital Signs Last 24 Hrs  T(C): 36.9 (22 May 2025 08:16), Max: 36.9 (21 May 2025 15:38)  T(F): 98.4 (22 May 2025 08:16), Max: 98.4 (21 May 2025 15:38)  HR: 68 (22 May 2025 08:16) (50 - 79)  BP: 141/93 (22 May 2025 08:16) (120/87 - 159/93)  BP(mean): --  RR: 18 (22 May 2025 08:16) (12 - 18)  SpO2: 99% (22 May 2025 08:16) (97% - 100%)    Parameters below as of 22 May 2025 08:16  Patient On (Oxygen Delivery Method): room air      I&O's Detail    21 May 2025 07:01  -  22 May 2025 07:00  --------------------------------------------------------  IN:    Oral Fluid: 240 mL    sodium chloride 0.9%: 1800 mL  Total IN: 2040 mL    OUT:    Voided (mL): 200 mL  Total OUT: 200 mL    Total NET: 1840 mL        PHYSICAL EXAM:  GENERAL: NAD  HEAD:  Atraumatic, Normocephalic  NECK: Supple, No JVD, Normal thyroid  NERVOUS SYSTEM:  Alert & Oriented X3, Good concentration; Motor Strength 5/5 B/L upper and lower extremities  CHEST/LUNG: Clear to auscultation bilaterally  HEART: Regular rate and rhythm; No murmurs, rubs, or gallops  ABDOMEN: Soft, Nontender, Nondistended; Bowel sounds present  EXTREMITIES:  2+ Peripheral Pulses, Right hip with clean dressing  SKIN: No rashes or lesions        MEDICATIONS  (STANDING):  acetaminophen     Tablet .. 975 milliGRAM(s) Oral every 8 hours  aspirin enteric coated 81 milliGRAM(s) Oral two times a day  ezetimibe 10 milliGRAM(s) Oral at bedtime  ketorolac   Injectable 15 milliGRAM(s) IV Push every 6 hours  levothyroxine 300 MICROGram(s) Oral daily  losartan 100 milliGRAM(s) Oral daily  pantoprazole    Tablet 40 milliGRAM(s) Oral before breakfast  rosuvastatin 20 milliGRAM(s) Oral at bedtime  senna 2 Tablet(s) Oral at bedtime  sodium chloride 0.9% lock flush 3 milliLiter(s) IV Push every 8 hours  sodium chloride 0.9%. 1000 milliLiter(s) (150 mL/Hr) IV Continuous <Continuous>    MEDICATIONS  (PRN):  magnesium hydroxide Suspension 30 milliLiter(s) Oral daily PRN Constipation  oxyCODONE    IR 5 milliGRAM(s) Oral every 3 hours PRN Moderate Pain (4 - 6)  oxyCODONE    IR 10 milliGRAM(s) Oral every 3 hours PRN Severe Pain (7 - 10)  traMADol 50 milliGRAM(s) Oral every 4 hours PRN Mild Pain (1 - 3)      RADIOLOGY & ADDITIONAL TESTS:  < from: Xray Hip w/ Pelvis 2 or 3 Views, Right (05.21.25 @ 14:29) >  ACC: [id number] EXAM:  XR HIP WITH PELV 2-3V RT   ORDERED BY: TERRI MANCINI     PROCEDURE DATE:  05/21/2025          INTERPRETATION:  CLINICAL HISTORY: Status post total right hip   arthroplasty.    TECHNIQUE: AP views of the pelvis and right hip    COMPARISON: CT right hip dated 4/21/2025    FINDINGS:  Status post total right hip arthroplasty with components well-positioned   and in proper alignment. No acute fracture or periprosthetic bony   abnormality. Postoperative pneumarthrosis. Degenerative joint space   narrowing and osteophytes of the left hip joint. Avascular necrosis of   the left femoral head is better seen on prior CT.    IMPRESSION:  Normal-appearing total right hip arthroplasty.    --- End of Report ---          EN BURCH DO; Resident Radiologist  This document has been electronically signed.  SHANIQUE LOPES MD; Attending Radiologist  This document has been electronically signed. May 21 2025  4:05PM    < end of copied text >  
[facility]    [id number]    History: Patient seen and eval at bedside. Patient is doing well and is comfortable. The patient's pain is controlled using the prescribed pain medications. The patient is participating in physical therapy. Denies nausea, vomiting, chest pain, shortness of breath, abdominal pain or fever.     T(C): 36.6 (05-22-25 @ 04:37), Max: 36.9 (05-21-25 @ 15:38)  HR: 70 (05-22-25 @ 04:37) (50 - 79)  BP: 155/92 (05-22-25 @ 04:37) (120/87 - 159/93)  RR: 18 (05-22-25 @ 04:37) (12 - 18)  SpO2: 99% (05-22-25 @ 04:37) (97% - 100%)      PE: NAD, alert, awake  Right LE:   Hip dressing C/D/I, no drainage, no bleeding  EHL/TA/FHL/GS intact, DP pulse 2+  Gross sensation to LT intact distally s/s/DP/SP/tib distrib, Calf soft, NT B/L    Primary Orthopedic Assessment:  • s/p RIGHT ANTERIOR total hip replacement POD#0    Plan:   • DVT prophylaxis as prescribed - ASA, including use of compression devices and ankle pumps  • Continue physical therapy: Weightbearing as tolerated of the right lower extremity with assistance of a walker  • Incentive spirometry encouraged  • Pain control as clinically indicated  • Anterior hip precautions   • Discharge planning: home today pending PT and med clearance 
Ortho Post Op Check  Name: TRISTAN BLACK  MR #: [id number]    Procedure: right jasbir  Surgeon: Dr. Olmedo    Pt comfortable without complaints, pain controlled  Denies CP, SOB, N/V, numbness/tingling     General Exam:  Vital Signs Last 24 Hrs  T(C): 36.3 (05-21-25 @ 13:45), Max: 36.3 (05-21-25 @ 08:48)  T(F): 97.3 (05-21-25 @ 13:45), Max: 97.3 (05-21-25 @ 08:48)  HR: 64 (05-21-25 @ 14:30) (50 - 64)  BP: 129/88 (05-21-25 @ 14:30) (120/87 - 155/94)  BP(mean): --  RR: 14 (05-21-25 @ 14:30) (12 - 16)  SpO2: 100% (05-21-25 @ 14:30) (100% - 100%)    General: Pt Alert and oriented, NAD, controlled pain.  Dressings C/D/I. No bleeding.  Pulses: 2+ dorsalis pedis pulse. Cap refill < 2 sec.  Sensation: Grossly intact to light touch without deficit.  Motor: + EHL/FHL/TA/GS    T(C): 36.3 (05-21-25 @ 13:45), Max: 36.3 (05-21-25 @ 08:48)  HR: 64 (05-21-25 @ 14:30) (50 - 64)  BP: 129/88 (05-21-25 @ 14:30) (120/87 - 155/94)  RR: 14 (05-21-25 @ 14:30) (12 - 16)  SpO2: 100% (05-21-25 @ 14:30) (100% - 100%)    A/P: 50y Male POD#0 s/p right JASBIR  - Stable  - Pain Control  - DVT ppx: ASA 81 mg BID  - Post op abx: Ancef  - PT eval pending  - Weight bearing status: WBAT RLE  
Pelvis & hip films reviewed. Implants are in appropriate position. No fracture or dislocation noted. Patient is WBAT of the surgical extremity.

## 2025-06-12 ENCOUNTER — APPOINTMENT (OUTPATIENT)
Dept: ORTHOPEDIC SURGERY | Facility: CLINIC | Age: 50
End: 2025-06-12
Payer: COMMERCIAL

## 2025-06-12 VITALS
DIASTOLIC BLOOD PRESSURE: 92 MMHG | HEIGHT: 70 IN | BODY MASS INDEX: 35.79 KG/M2 | SYSTOLIC BLOOD PRESSURE: 137 MMHG | WEIGHT: 250 LBS

## 2025-06-12 PROCEDURE — 73502 X-RAY EXAM HIP UNI 2-3 VIEWS: CPT

## 2025-06-12 PROCEDURE — 99024 POSTOP FOLLOW-UP VISIT: CPT

## 2025-07-02 ENCOUNTER — APPOINTMENT (OUTPATIENT)
Dept: ORTHOPEDIC SURGERY | Facility: CLINIC | Age: 50
End: 2025-07-02
Payer: COMMERCIAL

## 2025-07-02 PROBLEM — Z47.1 AFTERCARE FOLLOWING JOINT REPLACEMENT: Status: ACTIVE | Noted: 2025-07-02

## 2025-07-02 PROCEDURE — 73502 X-RAY EXAM HIP UNI 2-3 VIEWS: CPT

## 2025-07-02 PROCEDURE — 99024 POSTOP FOLLOW-UP VISIT: CPT

## 2025-08-22 ENCOUNTER — APPOINTMENT (OUTPATIENT)
Dept: ORTHOPEDIC SURGERY | Facility: CLINIC | Age: 50
End: 2025-08-22

## 2025-08-22 DIAGNOSIS — Z47.1 AFTERCARE FOLLOWING JOINT REPLACEMENT SURGERY: ICD-10-CM

## 2025-08-22 DIAGNOSIS — Z96.641 AFTERCARE FOLLOWING JOINT REPLACEMENT SURGERY: ICD-10-CM

## 2025-08-22 DIAGNOSIS — Z96.641 PRESENCE OF RIGHT ARTIFICIAL HIP JOINT: ICD-10-CM

## 2025-08-22 PROCEDURE — 99212 OFFICE O/P EST SF 10 MIN: CPT

## 2025-08-22 PROCEDURE — 73502 X-RAY EXAM HIP UNI 2-3 VIEWS: CPT

## (undated) DEVICE — WARMING BLANKET UPPER ADULT

## (undated) DEVICE — NDL HYPO SAFE 20G X 1.5" (YELLOW)

## (undated) DEVICE — VENODYNE/SCD SLEEVE CALF MEDIUM

## (undated) DEVICE — SUT SILK 2-0 24" TIES

## (undated) DEVICE — GLV 6.5 PROTEXIS (WHITE)

## (undated) DEVICE — SUT MONOCRYL 3-0 18" PS-1

## (undated) DEVICE — ELCTR AQUAMANTYS BIPOLAR SEALER 6.0

## (undated) DEVICE — DRAPE TOWEL BLUE STICKY

## (undated) DEVICE — DRSG WEBRIL 6"

## (undated) DEVICE — SUT VICRYL 3-0 18" SH UNDYED (POP-OFF)

## (undated) DEVICE — TUBING DEPUY MITEK FMS VUE INFLOW

## (undated) DEVICE — DRAPE 1/2 SHEET 40X57"

## (undated) DEVICE — SOL IRR POUR H2O 1000ML

## (undated) DEVICE — DRSG KERLIX MED 6"

## (undated) DEVICE — SHAVER BLADE LINVATEC FULL RADIUS RESECTOR 4.8MM

## (undated) DEVICE — SUT VICRYL 2-0 27" CT-2 UNDYED

## (undated) DEVICE — PACK KNEE ARTHROSCOPY

## (undated) DEVICE — TOURNIQUET CUFF 34" DUAL PORT W PLC

## (undated) DEVICE — DRAPE XL SHEET 77X98"

## (undated) DEVICE — GLV 7.5 PROTEXIS (WHITE)

## (undated) DEVICE — LAP PAD W RING 18 X 18"

## (undated) DEVICE — DRSG COMBINE 5X9"

## (undated) DEVICE — GLV 8.5 NEOPRENE (LIGHT BROWN)

## (undated) DEVICE — ELCTR BOVIE TIP BLADE INSULATED 4" EDGE

## (undated) DEVICE — TUBING SET GRAVITY 4 SPIKE

## (undated) DEVICE — DRAPE 3/4 SHEET 52X76"

## (undated) DEVICE — SUT ETHIBOND EXCEL 2 30" V-37 GREEN

## (undated) DEVICE — ARTHREX SCORPION NEEDLE KNEE

## (undated) DEVICE — SLING SHOULDER IMMOBILIZER CLINIC LARGE

## (undated) DEVICE — WOUND IRR SURGIPHOR

## (undated) DEVICE — TUBING DEPUY MITEK FMS OUTFLOW

## (undated) DEVICE — HOOD T7 NON-PEELAWAY

## (undated) DEVICE — SOL BAG NS 0.9% 1000ML

## (undated) DEVICE — SAW BLADE ZIMMER FOR STRYKER OSCILLATING FLAT 90X19X1.27MM

## (undated) DEVICE — DRSG OPSITE 2.5 X 2"

## (undated) DEVICE — SOL IRR POUR NS 0.9% 1000ML

## (undated) DEVICE — MAKO DRAPE KIT

## (undated) DEVICE — DRAPE STICKY U BLUE 60 X 84"

## (undated) DEVICE — SHAVER BLADE GREAT WHITE 4.2MM

## (undated) DEVICE — SOL IRR BAG NS 0.9% 3000ML

## (undated) DEVICE — VISITEC 4X4

## (undated) DEVICE — DRSG CURITY GAUZE SPONGE 4 X 4" 12-PLY

## (undated) DEVICE — SUT MONOCRYL 3-0 27" PS-2 UNDYED

## (undated) DEVICE — SUT STRATAFIX SPIRAL PDS PLUS 1 35X35CM CTX VIOLET

## (undated) DEVICE — BLADE SURGICAL #15 CARBON

## (undated) DEVICE — GLV 8 PROTEXIS (WHITE)

## (undated) DEVICE — HOOD FLYTE STRYKER SURGICOOL W PEELAWAY

## (undated) DEVICE — MAKO VIZADISC HIP PROCEDURE TRACKING KIT

## (undated) DEVICE — SYR ASEPTO

## (undated) DEVICE — SHAVER BLADE LINVATEC ULTRAFRR 3.5MM

## (undated) DEVICE — DRSG KLING 4"

## (undated) DEVICE — S&N ARTHROCARE WAND SUPER TURBOVAC 90 DEGREE ICW

## (undated) DEVICE — DRAPE IOBAN 33" X 23"

## (undated) DEVICE — SYR LUER LOK 30CC

## (undated) DEVICE — NDL SPINAL 18G X 3.5" (PINK)

## (undated) DEVICE — ELCTR STRYKER NEPTUNE SMOKE EVACUATION PENCIL (GREEN)

## (undated) DEVICE — DRSG DERMABOND 0.7ML

## (undated) DEVICE — SUT PDS II 1 27" CT-1

## (undated) DEVICE — ELCTR GROUNDING PAD ADULT COVIDIEN

## (undated) DEVICE — TOURNIQUET ESMARK 6"

## (undated) DEVICE — DRAPE BACK TABLE COVER HEAVY DUTY 2 TIER 72"

## (undated) DEVICE — DRAPE C ARM UNIVERSAL

## (undated) DEVICE — DRSG MEPILEX 10 X 25CM (4 X 10") POST OP AG SILVER

## (undated) DEVICE — PACK TOTAL HIP